# Patient Record
Sex: FEMALE | HISPANIC OR LATINO
[De-identification: names, ages, dates, MRNs, and addresses within clinical notes are randomized per-mention and may not be internally consistent; named-entity substitution may affect disease eponyms.]

---
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
## 2024-08-29 ENCOUNTER — LAB ENCOUNTER (OUTPATIENT)
Dept: LAB | Age: 19
End: 2024-08-29
Attending: INTERNAL MEDICINE
Payer: COMMERCIAL

## 2024-08-29 ENCOUNTER — HOSPITAL ENCOUNTER (OUTPATIENT)
Dept: GENERAL RADIOLOGY | Age: 19
Discharge: HOME | End: 2024-08-29
Attending: INTERNAL MEDICINE
Payer: COMMERCIAL

## 2024-08-29 ENCOUNTER — OFFICE VISIT (OUTPATIENT)
Dept: RHEUMATOLOGY | Facility: CLINIC | Age: 19
End: 2024-08-29
Payer: COMMERCIAL

## 2024-08-29 VITALS
DIASTOLIC BLOOD PRESSURE: 76 MMHG | HEIGHT: 66 IN | HEART RATE: 93 BPM | TEMPERATURE: 99 F | WEIGHT: 260 LBS | SYSTOLIC BLOOD PRESSURE: 110 MMHG | BODY MASS INDEX: 41.78 KG/M2 | OXYGEN SATURATION: 98 % | RESPIRATION RATE: 16 BRPM

## 2024-08-29 DIAGNOSIS — M05.79 RHEUMATOID ARTHRITIS INVOLVING MULTIPLE SITES WITH POSITIVE RHEUMATOID FACTOR (HCC): ICD-10-CM

## 2024-08-29 DIAGNOSIS — M15.0 PRIMARY OSTEOARTHRITIS INVOLVING MULTIPLE JOINTS: ICD-10-CM

## 2024-08-29 DIAGNOSIS — M05.79 RHEUMATOID ARTHRITIS INVOLVING MULTIPLE SITES WITH POSITIVE RHEUMATOID FACTOR (HCC): Primary | ICD-10-CM

## 2024-08-29 PROBLEM — M15.9 OSTEOARTHRITIS OF MULTIPLE JOINTS: Status: ACTIVE | Noted: 2024-08-29

## 2024-08-29 LAB
ALBUMIN SERPL-MCNC: 4.9 G/DL (ref 3.2–4.8)
ALBUMIN/GLOB SERPL: 1.4 {RATIO} (ref 1–2)
ALP LIVER SERPL-CCNC: 90 U/L
ALT SERPL-CCNC: 52 U/L
ANION GAP SERPL CALC-SCNC: 4 MMOL/L (ref 0–18)
AST SERPL-CCNC: 24 U/L (ref ?–34)
BASOPHILS # BLD AUTO: 0.03 X10(3) UL (ref 0–0.2)
BASOPHILS NFR BLD AUTO: 0.4 %
BILIRUB SERPL-MCNC: 0.7 MG/DL (ref 0.3–1.2)
BUN BLD-MCNC: 10 MG/DL (ref 9–23)
CALCIUM BLD-MCNC: 10 MG/DL (ref 8.7–10.4)
CHLORIDE SERPL-SCNC: 101 MMOL/L (ref 98–112)
CO2 SERPL-SCNC: 32 MMOL/L (ref 21–32)
CREAT BLD-MCNC: 0.53 MG/DL
CRP SERPL-MCNC: 0.8 MG/DL (ref ?–0.5)
EGFRCR SERPLBLD CKD-EPI 2021: 136 ML/MIN/1.73M2 (ref 60–?)
EOSINOPHIL # BLD AUTO: 0.05 X10(3) UL (ref 0–0.7)
EOSINOPHIL NFR BLD AUTO: 0.6 %
ERYTHROCYTE [DISTWIDTH] IN BLOOD BY AUTOMATED COUNT: 14.3 %
ERYTHROCYTE [SEDIMENTATION RATE] IN BLOOD: 53 MM/HR
FASTING STATUS PATIENT QL REPORTED: YES
GLOBULIN PLAS-MCNC: 3.5 G/DL (ref 2–3.5)
GLUCOSE BLD-MCNC: 113 MG/DL (ref 70–99)
HBV CORE AB SERPL QL IA: NONREACTIVE
HBV SURFACE AB SER QL: REACTIVE
HBV SURFACE AB SERPL IA-ACNC: 50.39 MIU/ML
HBV SURFACE AG SER-ACNC: <0.1 [IU]/L
HBV SURFACE AG SERPL QL IA: NONREACTIVE
HCT VFR BLD AUTO: 38.7 %
HGB BLD-MCNC: 12.2 G/DL
IGA SERPL-MCNC: 484.2 MG/DL (ref 40–350)
IGM SERPL-MCNC: 118.5 MG/DL (ref 50–300)
IMM GRANULOCYTES # BLD AUTO: 0.05 X10(3) UL (ref 0–1)
IMM GRANULOCYTES NFR BLD: 0.6 %
IMMUNOGLOBULIN PNL SER-MCNC: 1569 MG/DL (ref 650–1600)
LYMPHOCYTES # BLD AUTO: 1.46 X10(3) UL (ref 1–4)
LYMPHOCYTES NFR BLD AUTO: 17.2 %
MCH RBC QN AUTO: 25.3 PG (ref 26–34)
MCHC RBC AUTO-ENTMCNC: 31.5 G/DL (ref 31–37)
MCV RBC AUTO: 80.1 FL
MONOCYTES # BLD AUTO: 0.61 X10(3) UL (ref 0.1–1)
MONOCYTES NFR BLD AUTO: 7.2 %
NEUTROPHILS # BLD AUTO: 6.28 X10 (3) UL (ref 1.5–7.7)
NEUTROPHILS # BLD AUTO: 6.28 X10(3) UL (ref 1.5–7.7)
NEUTROPHILS NFR BLD AUTO: 74 %
OSMOLALITY SERPL CALC.SUM OF ELEC: 284 MOSM/KG (ref 275–295)
PLATELET # BLD AUTO: 363 10(3)UL (ref 150–450)
POTASSIUM SERPL-SCNC: 4.3 MMOL/L (ref 3.5–5.1)
PROT SERPL-MCNC: 8.4 G/DL (ref 5.7–8.2)
RBC # BLD AUTO: 4.83 X10(6)UL
SODIUM SERPL-SCNC: 137 MMOL/L (ref 136–145)
URATE SERPL-MCNC: 7 MG/DL
VIT D+METAB SERPL-MCNC: 12.4 NG/ML (ref 30–100)
WBC # BLD AUTO: 8.5 X10(3) UL (ref 4–11)

## 2024-08-29 PROCEDURE — 85652 RBC SED RATE AUTOMATED: CPT

## 2024-08-29 PROCEDURE — 84165 PROTEIN E-PHORESIS SERUM: CPT

## 2024-08-29 PROCEDURE — 82306 VITAMIN D 25 HYDROXY: CPT

## 2024-08-29 PROCEDURE — 86334 IMMUNOFIX E-PHORESIS SERUM: CPT

## 2024-08-29 PROCEDURE — 87522 HEPATITIS C REVRS TRNSCRPJ: CPT

## 2024-08-29 PROCEDURE — 86704 HEP B CORE ANTIBODY TOTAL: CPT

## 2024-08-29 PROCEDURE — 3008F BODY MASS INDEX DOCD: CPT | Performed by: INTERNAL MEDICINE

## 2024-08-29 PROCEDURE — 86235 NUCLEAR ANTIGEN ANTIBODY: CPT

## 2024-08-29 PROCEDURE — 86225 DNA ANTIBODY NATIVE: CPT

## 2024-08-29 PROCEDURE — 71046 X-RAY EXAM CHEST 2 VIEWS: CPT | Performed by: INTERNAL MEDICINE

## 2024-08-29 PROCEDURE — 73130 X-RAY EXAM OF HAND: CPT | Performed by: INTERNAL MEDICINE

## 2024-08-29 PROCEDURE — 36415 COLL VENOUS BLD VENIPUNCTURE: CPT

## 2024-08-29 PROCEDURE — 83521 IG LIGHT CHAINS FREE EACH: CPT

## 2024-08-29 PROCEDURE — 86140 C-REACTIVE PROTEIN: CPT

## 2024-08-29 PROCEDURE — 3074F SYST BP LT 130 MM HG: CPT | Performed by: INTERNAL MEDICINE

## 2024-08-29 PROCEDURE — 84550 ASSAY OF BLOOD/URIC ACID: CPT

## 2024-08-29 PROCEDURE — 3078F DIAST BP <80 MM HG: CPT | Performed by: INTERNAL MEDICINE

## 2024-08-29 PROCEDURE — 80053 COMPREHEN METABOLIC PANEL: CPT

## 2024-08-29 PROCEDURE — 82784 ASSAY IGA/IGD/IGG/IGM EACH: CPT

## 2024-08-29 PROCEDURE — 85025 COMPLETE CBC W/AUTO DIFF WBC: CPT

## 2024-08-29 PROCEDURE — 86706 HEP B SURFACE ANTIBODY: CPT

## 2024-08-29 PROCEDURE — 99205 OFFICE O/P NEW HI 60 MIN: CPT | Performed by: INTERNAL MEDICINE

## 2024-08-29 PROCEDURE — 73630 X-RAY EXAM OF FOOT: CPT | Performed by: INTERNAL MEDICINE

## 2024-08-29 PROCEDURE — 87340 HEPATITIS B SURFACE AG IA: CPT

## 2024-08-29 PROCEDURE — 96372 THER/PROPH/DIAG INJ SC/IM: CPT | Performed by: INTERNAL MEDICINE

## 2024-08-29 RX ORDER — SULFASALAZINE 500 MG/1
500 TABLET ORAL 2 TIMES DAILY
Qty: 60 TABLET | Refills: 1 | Status: SHIPPED | OUTPATIENT
Start: 2024-08-29

## 2024-08-29 RX ORDER — METHYLPREDNISOLONE ACETATE 80 MG/ML
80 INJECTION, SUSPENSION INTRA-ARTICULAR; INTRALESIONAL; INTRAMUSCULAR; SOFT TISSUE ONCE
Status: COMPLETED | OUTPATIENT
Start: 2024-08-29 | End: 2024-08-29

## 2024-08-29 RX ORDER — PREDNISONE 10 MG/1
10 TABLET ORAL DAILY
Qty: 30 TABLET | Refills: 0 | Status: SHIPPED | OUTPATIENT
Start: 2024-08-29

## 2024-08-29 RX ADMIN — METHYLPREDNISOLONE ACETATE 80 MG: 80 INJECTION, SUSPENSION INTRA-ARTICULAR; INTRALESIONAL; INTRAMUSCULAR; SOFT TISSUE at 10:19:00

## 2024-08-29 NOTE — PROGRESS NOTES
[facility], [address] [place] [address]      Consult     Kath Echevarria Patient Status:  No patient class for patient encounter    2005 MRN [id number]   Location [facility], [address] [place] [address] Attending No att. providers found   Hosp Day # 0 PCP Avinash Riggs MD     Referring Provider: PCP    Reason for Consultation:   Component  Ref Range & Units 23  5:38 PM   RA LATEX TURBID.  <14.0 IU/mL 74.0 High      Component  Ref Range & Units 23  5:38 PM   C-REACTIVE PROTEIN, QUANT  0 - 10 mg/L 13 High      Component 23  5:38 PM   ANTINUCLEAR ANTIBODIES, IFA Positive Abnormal    Comment:                    Component  Ref Range & Units 10/10/22 12:32 PM   Cyclic Citrullinated Peptide Antibody  <=19 Units 140 High    Comment: Approximately 70% of patients with Rheumatoid Arthritis(RA) are positive for CCP antibody, while only 2% of random blood donors are positive.  The diagnostic value of CCP antibody i     omponent  Ref Range & Units 10/10/22 12:32 PM   Uric Acid  2.6 - 5.9 mg/dL 6.8 High    Comment: The optimal level of uric acid in patients with gout or uric acid stone formation should be 6.0 mg/dL or less and      Subjective:    Kath Echevarria is a 18 year old female with comes in for further evaluation of hand pain and stiffness around early 2022. Then started having issues with wrist and fingers and then went to ER and had X rays of the hand with swelling.     Then saw rheumatologist after trying NSAIDS.    Then started starting happening more frequently. Pain occurred every day and PCP started the plaquenil in . And took medication for about 1 month and maybe stopped because of no followup and no refills.     NO major issues with shoulders, elbows ,     Has constant pain and swelling wrists or hands (difficulty with gribbing)     Has some on and off knee pain; (right) no low back; hip pain (on and off)     Denies any major issues with  her neck    Denies any history of DVT PE TIA CVA seizures migraines or headaches    States no shortness of breath ,chest pain ,fevers ,chills    States no urinary or bowel symptoms; bloody stools    States no headaches jaw pain, vision changes (once in while)     States no history of pericardial, pleural effusions     States no significant dry eyes or dry mouth     States no history of uveitis iritis scleritis    States no history of Raynaud's or digital ulcerations    States no major weight changes; night sweats    Wt Readings from Last 6 Encounters:   08/29/24 260 lb (117.9 kg)       Her weight has been stable    States no history of photosensitive or malar rash.    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 No pertinent past medical history.     Past Surgical History: History reviewed. No pertinent surgical history.    Social History:  reports that she has been smoking cigarettes. She has never used smokeless tobacco. She reports that she does not currently use alcohol. She reports that she does not use drugs.    Family History: History reviewed. No pertinent family history.    Allergies: No Known Allergies    Current Medications:  Current Outpatient Medications   Medication Sig Dispense Refill    sulfaSALAzine 500 MG Oral Tab Take 1 tablet (500 mg total) by mouth 2 (two) times daily. 60 tablet 1    predniSONE 10 MG Oral Tab Take 1 tablet (10 mg total) by mouth daily. With food 30 tablet 0      No current facility-administered medications for this visit.     (Not in a hospital admission)      Review of Systems:     Constitutional: Negative for chills, , fatigue, fever and unexpected weight change.    HENT: Negative for congestion, and mouth  sores.    Eyes: Negative for photophobia, pain, redness and visual disturbance.    Respiratory: Negative for apnea, cough, chest tightness, shortness of breath, wheezing and stridor.    Cardiovascular: Negative for chest pain, palpitations and leg swelling.    Gastrointestinal: Negative for abdominal distention, abdominal pain, blood in stool, constipation, diarrhea and nausea.    Endocrine: Negative.     Genitourinary: Negative for decreased urine volume, difficulty urinating, dyspareunia, dysuria, flank pain, and frequency.    Musculoskeletal: +arthralgias, NO gait problem and +joint swelling.    Skin: Negative for color change, pallor and rash. No raynauds or digital ulcerations no sclerodactly.    Allergic/Immunologic: Negative.    Neurological: Negative for dizziness, tremors, seizures, syncope, speech difficulty, weakness, light-headedness, numbness and headaches.    Hematological: Does not bruise/bleed easily.    Psychiatric/Behavioral: Negative for confusion, decreased concentration, hallucinations, self-injury, sleep disturbance and suicidal ideas or depression.    Objective:   Vitals:    08/29/24 0924   BP: 110/76   Pulse: 93   Resp: 16   Temp: 98.7 °F (37.1 °C)          Constitutional: is oriented to person, place, and time. Appears well-developed and well-nourished. No distress.    HEENT: Normocephalic; EOMI; no jvd; no LAD; no oral or nasal ulcers.     Eyes: Conjunctivae and EOM are normal. Pupils are equal, round, and reactive to light.     Neck: Normal range of motion. No thyromegaly present.    Cardiovascular: RRR, no murmurs.    Lungs: Clear, Bilateral air entry, no wheezes.    Abdominal: Soft.    Musculoskeletal:         Joint Exam 08/29/2024        Right  Left   Acromioclavicular   Tender   Tender   Glenohumeral   Tender   Tender   Elbow   Tender   Tender   Wrist  Swollen Tender  Swollen Tender   CMC  Swollen Tender  Swollen Tender   MCP 1  Swollen Tender      MCP 2  Swollen Tender  Swollen Tender    MCP 3  Swollen Tender  Swollen Tender   PIP 2 (finger)  Swollen Tender  Swollen Tender   PIP 3 (finger)  Swollen Tender  Swollen Tender   PIP 4 (finger)  Swollen Tender  Swollen Tender   PIP 5 (finger)  Swollen Tender  Swollen Tender   Hip   Tender   Tender   Knee  Swollen Tender   Tender   MTP 1  Swollen Tender  Swollen Tender   MTP 2  Swollen Tender  Swollen Tender   MTP 3  Swollen   Swollen Tender   MTP 4  Swollen Tender  Swollen Tender   IP (toe)  Swollen            Swollen: 27      Tender: 34          Right shoulder: Exhibits normal range of motion on abduction and internal rotation, no tenderness, no bony tenderness, no deformity, no laceration, no pain and no spasm.        Left shoulder: Exhibits normal range of motion on abduction and internal and external rotation.  no tenderness, no bony tenderness, no swelling, no effusion, no deformity, no pain, no spasm and normal strength.        Right elbow:  Exhibits normal range of motion, no swelling, no effusion and no deformity. No tenderness found. No medial epicondyle, no lateral epicondyle and no olecranon process tenderness noted. There are no contractures or tophi or nodules.        Left elbow:  Normal range of motion, no swelling, no effusion and no deformity. No medial epicondyle, no lateral epicondyle and no olecranon process tenderness noted. There are no contractures or tophi or nodules.        Right wrist:  Exhibits normal range of motion, no tenderness, no bony tenderness, no swelling, no effusion and no crepitus. Flexion and extension intact w/o limitation.        Left wrist: Exhibits normal range of motion, no tenderness, no bony tenderness, no swelling, no effusion, no crepitus and no deformity. Flexion and extension intact without limitation.        Right hip: Exhibits normal range of motion, normal strength, no tenderness, no bony tenderness, no swelling and no crepitus.        Right hand: No synovitis of MCP,PIP or DIP joints; no Bouchards or  Heberden nodules noted;  strength: 100%.        Left hand: No synovitis of MCP,PIP or DIP joints; no Bouchards or Heberden nodules noted;  strength: 100%.        Left hip: Exhibits normal range of motion, normal strength, no tenderness, no bony tenderness, No swelling and no crepitus.        Right knee: Exhibits normal range of motion, no swelling, no effusion, no ecchymosis, no deformity and no erythema. No tenderness found. No medial joint line, no lateral joint line, no MCL and no LCL tenderness noted. No crepitation on flexion of knee and extension normal.        Left knee:  Exhibits normal range of motion, no swelling, no effusion, no ecchymosis and no erythema. No tenderness found. No medial joint line, no lateral joint line and no patellar tendon tenderness noted. No crepitation on flexion of the knee. Extension intact and normal.        Right ankle: No swelling, no deformity. No tenderness. Dorsiflexion and plantar flexion intact without limitation in range of motion.        Left ankle: Exhibits no swelling. No tenderness. No lateral malleolus and no medial malleolus tenderness found. Achilles tendon normal. Achilles tendon exhibits no pain, no defect and normal Blanton's test results.  Dorsiflexion and plantar flexion intact without limitation in range of motion.        Cervical back: Exhibits normal range of motion, no tenderness, no bony tenderness, no swelling, no pain and no spasm.        Thoracic back: Exhibits normal range of motion, no tenderness, no bony tenderness and no spasm.        Lumbar back:  Exhibits normal range of motion, no tenderness, no bony tenderness, no pain and no spasm.        Right foot: normal. There is normal range of motion, no tenderness, no bony tenderness, no crepitus and no laceration. There is no synovitis or tenderness of the MTP joints to palpation.        Left foot: normal. There is normal range of motion, no tenderness, no bony tenderness and no crepitus.  There is no synovitis or tenderness of the MTP joints to palpation.    Lymphadenopathy: No submental, no submandibular, and no occipital adenopathy present, has no cervical adenopathy or axillary lympadenopathy.    Neurological: Alert and oriented. No focal motor or sensory abnormalities. Strength is 5/5 Upper Extremities/Lower Extremities proximally and distally.    Skin: Skin is warm, dry and intact.    Psychiatric: Normal behavior.    Results:    Labs:    omponent  Ref Range & Units 11/10/22 12:18 PM   Glucose 6 Phosphatase Dehydrogenase, Quantitative  9.9 - 16.6 U/g Hb 18.5 High    Comment:  Elevated glucose-6-phosphate dehydrogenase activity may occur when  reticulocytes or white blood cells are present in high  concentrations.  Performed By: [facility]  [address]     Component 10/10/22 12:32 PM   HLA-B27 NEGATIVE   Interpretive Comment HLA B27 NEGATIVE  Approximately 90% of Am     omponent  Ref Range & Units 3/2/23  1:48 PM   QuantiFERON Incubation Incubation performed.   QuantiFERON-TB Gold Plus  Negative Negative   Comment: No response to M tuberculosis antigens detected.  Infection with M tuberculosis is unlikely, but high risk  individuals should be considered for additional testing       No results found for: \"WBC\", \"RBC\", \"HGB\", \"HCT\", \"MCV\", \"MCH\", \"MCHC\", \"RDW\", \"PLT\", \"MPV\", \"LYMPHOCYTES\", \"NEUT\"    No components found for: \"RELY\", \"NMET\", \"MYEL\", \"PROMY\", \"ROSE\", \"ABSNEUTS\", \"ABSBANDS\", \"ABMM\", \"ABMY\", \"ABPM\", \"ABBL\"      No results found for: \"NA\", \"K\", \"CHLORIDE\", \"CO2\", \"BUN\", \"CREAT\", \"GFR\", \"CALCIUM\", \"ALB\", \"ALKPHOS\", \"AST\", \"ALT\"       No components found for: \"ESRWESTERGRN\"       No results found for: \"CRP\"      No results found for: \"COLOR\", \"CLARITY\", \"UROBILINOGEN\", \"YEAST\"  @LABRCNTIP(RF,B12)@      [unfilled]    Imaging:  XR FOOT, COMPLETE (MIN 3 VIEWS), RIGHT (CPT=73630)    Result Date: 8/29/2024  CONCLUSION:  No evidence of acute displaced fracture or dislocation in the  right foot.  LOCATION:  [id number]   Dictated by (CST): Jayy Branch MD on 8/29/2024 at 12:33 PM     Finalized by (CST): Jayy Branch MD on 8/29/2024 at 12:33 PM       XR FOOT, COMPLETE (MIN 3 VIEWS), LEFT (CPT=73630)    Result Date: 8/29/2024  CONCLUSION:  No evidence of acute displaced fracture or dislocation in the left foot.  LOCATION:  [id number]   Dictated by (CST): Jayy Branch MD on 8/29/2024 at 12:31 PM     Finalized by (CST): Jayy Branch MD on 8/29/2024 at 12:32 PM       XR HAND (MIN 3 VIEWS), LEFT (CPT=73130)    Result Date: 8/29/2024  CONCLUSION:  No evidence of acute displaced fracture or dislocation in the left hand.  LOCATION:  [id number]   Dictated by (CST): Jayy Branch MD on 8/29/2024 at 12:31 PM     Finalized by (CST): Jayy Branch MD on 8/29/2024 at 12:31 PM       XR CHEST PA + LAT CHEST (CPT=71046)    Result Date: 8/29/2024  CONCLUSION:  No active cardiopulmonary process identified.   LOCATION:  [id number]   Dictated by (CST): Jayy Branch MD on 8/29/2024 at 12:31 PM     Finalized by (CST): Jayy Branch MD on 8/29/2024 at 12:31 PM       XR HAND (MIN 3 VIEWS), RIGHT (CPT=73130)    Result Date: 8/29/2024  CONCLUSION:  Minimal osteoarthritic changes are present. No acute fracture or other acute bony process.   LOCATION:  [facility]   Dictated by (CST): Kain Grijalva MD on 8/29/2024 at 11:58 AM     Finalized by (CST): Kain Grijalva MD on 8/29/2024 at 12:00 PM        Assessment & Plan:      18-year-old woman comes in for further evaluation for joint swelling stiffness positive RF CCP greater than 150 elevated ESR CRP    Seropositive rheumatoid arthritis  Mild osteoarthritis multiple joint    Patient has moderate synovitis on exam  Depo-Medrol 80 mg subcu today  Complete autoimmune workup borderline hyperuricemia will repeat uric acid levels to make sure she does not have gouty arthritis as well  Will get x-rays of the hands and feet to monitor for baseline radiographic changes x-ray of the  hand noted mild osteoarthritis chest x-ray normal no evidence of ILD  Patient was on hydroxychloroquine for 1 month would not be effective but patient has moderate synovitis in exam  Since she is sexually active and only doing condoms we will opt for sulfasalazine as a next step  Will start 500 mg twice a day.  Repeat CBC CMP sed rate 4 weeks  Potential risk side effects discussed  Recent CMP noted July 2024 will add on CBC with differential  Will also do prednisone 10 mg for 2 weeks 7.5 mg for 1 week 5 mg until seen back.  Risk of steroids discussed    Once we have x-rays and labs will have more definitive plan of treatment if we need to change any plan.    Discussed the diagnosis of rheumatoid arthritis in detail written information given to the patient as well and both steroids and sulfasalazine in the meantime    Education and counseling provided to patient.  Instructed patient to call my office or seek medical attention immediately if symptoms worsen. Risks and side effects of medications and diagnosis discussed in detail and patient was given written information on new prescribed medications.    Return to clinic:  Return in about 8 weeks (around 10/24/2024).    Christine Strickland MD  8/29/2024

## 2024-08-30 LAB
DSDNA IGG SERPL IA-ACNC: 2.3 IU/ML
ENA RNP IGG SER IA-ACNC: 2 U/ML
ENA SM IGG SER IA-ACNC: <0.7 U/ML
ENA SS-A IGG SER IA-ACNC: <0.4 U/ML
U1 SNRNP IGG SER IA-ACNC: 1.2 U/ML

## 2024-09-03 DIAGNOSIS — E55.9 VITAMIN D DEFICIENCY: Primary | ICD-10-CM

## 2024-09-03 LAB
ALBUMIN SERPL ELPH-MCNC: 4.28 G/DL (ref 3.75–5.21)
ALBUMIN/GLOB SERPL: 1.15 {RATIO} (ref 1–2)
ALPHA1 GLOB SERPL ELPH-MCNC: 0.26 G/DL (ref 0.19–0.46)
ALPHA2 GLOB SERPL ELPH-MCNC: 0.81 G/DL (ref 0.48–1.05)
B-GLOBULIN SERPL ELPH-MCNC: 1.14 G/DL (ref 0.68–1.23)
GAMMA GLOB SERPL ELPH-MCNC: 1.5 G/DL (ref 0.62–1.7)
KAPPA LC FREE SER-MCNC: 2.64 MG/DL (ref 0.33–1.94)
KAPPA LC FREE/LAMBDA FREE SER NEPH: 1.24 {RATIO} (ref 0.26–1.65)
LAMBDA LC FREE SERPL-MCNC: 2.13 MG/DL (ref 0.57–2.63)
PROT SERPL-MCNC: 8 G/DL (ref 5.7–8.2)

## 2024-09-03 NOTE — TELEPHONE ENCOUNTER
omponent  Ref Range & Units 5 d ago   Vitamin D, 25OH, Total  30.0 - 100.0 ng/mL 12.4 Low    Comment: Literature Recommendations for 25(OH)D levels are:  Range           Vitamin D Status   <20    ng/mL      Deficiency       Vitamin D is also very low at 12.4 would do high-dose vitamin D 50,000 units once a week for 3 months and then OTC at least 3 to 4000 IU D3 please send back prescription to me patient is willing she should have already been started on prednisone and sulfasalazine    Remind patient that she needs sulfasalazine labs in 4 weeks    Will place these orders accordingly if she is getting them done here we can mail it to wherever she would like

## 2024-09-05 RX ORDER — ERGOCALCIFEROL 1.25 MG/1
50000 CAPSULE, LIQUID FILLED ORAL WEEKLY
Qty: 12 CAPSULE | Refills: 0 | Status: SHIPPED | OUTPATIENT
Start: 2024-09-05 | End: 2024-10-05

## 2024-09-05 NOTE — TELEPHONE ENCOUNTER
Spoke to patient regarding the below message:    Spoke to patient regarding the below message:    omponent  Ref Range & Units 5 d ago   Vitamin D, 25OH, Total  30.0 - 100.0 ng/mL 12.4 Low    Comment: Literature Recommendations for 25(OH)D levels are:  Range           Vitamin D Status   <20    ng/mL      Deficiency         Vitamin D is also very low at 12.4 would do high-dose vitamin D 50,000 units once a week for 3 months and then OTC at least 3 to 4000 IU D3 please send back prescription to me patient is willing she should have already been started on prednisone and sulfasalazine     Remind patient that she needs sulfasalazine labs in 4 weeks     Will place these orders accordingly if she is getting them done here we can mail it to wherever she would like     Patient verbalized understanding and denied any questions at this time. She is agreeable to high dose Vitamin D. High dose Vitamin D pended below for your approval.

## 2024-11-04 ENCOUNTER — OFFICE VISIT (OUTPATIENT)
Dept: RHEUMATOLOGY | Facility: CLINIC | Age: 19
End: 2024-11-04
Payer: COMMERCIAL

## 2024-11-04 ENCOUNTER — LAB ENCOUNTER (OUTPATIENT)
Dept: LAB | Age: 19
End: 2024-11-04
Attending: INTERNAL MEDICINE
Payer: COMMERCIAL

## 2024-11-04 VITALS
OXYGEN SATURATION: 98 % | BODY MASS INDEX: 41.95 KG/M2 | HEIGHT: 66 IN | RESPIRATION RATE: 16 BRPM | SYSTOLIC BLOOD PRESSURE: 118 MMHG | DIASTOLIC BLOOD PRESSURE: 58 MMHG | HEART RATE: 89 BPM | WEIGHT: 261 LBS | TEMPERATURE: 98 F

## 2024-11-04 DIAGNOSIS — M15.0 PRIMARY OSTEOARTHRITIS INVOLVING MULTIPLE JOINTS: ICD-10-CM

## 2024-11-04 DIAGNOSIS — E55.9 VITAMIN D DEFICIENCY: ICD-10-CM

## 2024-11-04 DIAGNOSIS — Z79.899 ENCOUNTER FOR DRUG THERAPY: ICD-10-CM

## 2024-11-04 DIAGNOSIS — M05.79 RHEUMATOID ARTHRITIS INVOLVING MULTIPLE SITES WITH POSITIVE RHEUMATOID FACTOR (HCC): ICD-10-CM

## 2024-11-04 DIAGNOSIS — M05.79 RHEUMATOID ARTHRITIS INVOLVING MULTIPLE SITES WITH POSITIVE RHEUMATOID FACTOR (HCC): Primary | ICD-10-CM

## 2024-11-04 LAB
ALBUMIN SERPL-MCNC: 4.6 G/DL (ref 3.2–4.8)
ALBUMIN/GLOB SERPL: 1.4 {RATIO} (ref 1–2)
ALP LIVER SERPL-CCNC: 101 U/L
ALT SERPL-CCNC: 31 U/L
ANION GAP SERPL CALC-SCNC: 4 MMOL/L (ref 0–18)
AST SERPL-CCNC: 15 U/L (ref ?–34)
BASOPHILS # BLD AUTO: 0.05 X10(3) UL (ref 0–0.2)
BASOPHILS NFR BLD AUTO: 0.4 %
BILIRUB SERPL-MCNC: 0.5 MG/DL (ref 0.3–1.2)
BUN BLD-MCNC: 10 MG/DL (ref 9–23)
CALCIUM BLD-MCNC: 9.4 MG/DL (ref 8.7–10.4)
CHLORIDE SERPL-SCNC: 104 MMOL/L (ref 98–112)
CO2 SERPL-SCNC: 29 MMOL/L (ref 21–32)
CREAT BLD-MCNC: 0.89 MG/DL
CRP SERPL-MCNC: <0.4 MG/DL (ref ?–0.5)
EGFRCR SERPLBLD CKD-EPI 2021: 95 ML/MIN/1.73M2 (ref 60–?)
EOSINOPHIL # BLD AUTO: 0.06 X10(3) UL (ref 0–0.7)
EOSINOPHIL NFR BLD AUTO: 0.5 %
ERYTHROCYTE [DISTWIDTH] IN BLOOD BY AUTOMATED COUNT: 15.6 %
ERYTHROCYTE [SEDIMENTATION RATE] IN BLOOD: 42 MM/HR
FASTING STATUS PATIENT QL REPORTED: NO
GLOBULIN PLAS-MCNC: 3.2 G/DL (ref 2–3.5)
GLUCOSE BLD-MCNC: 121 MG/DL (ref 70–99)
HCT VFR BLD AUTO: 39.5 %
HGB BLD-MCNC: 12.7 G/DL
IMM GRANULOCYTES # BLD AUTO: 0.1 X10(3) UL (ref 0–1)
IMM GRANULOCYTES NFR BLD: 0.9 %
LYMPHOCYTES # BLD AUTO: 2.5 X10(3) UL (ref 1–4)
LYMPHOCYTES NFR BLD AUTO: 22.4 %
MCH RBC QN AUTO: 25.8 PG (ref 26–34)
MCHC RBC AUTO-ENTMCNC: 32.2 G/DL (ref 31–37)
MCV RBC AUTO: 80.3 FL
MONOCYTES # BLD AUTO: 0.81 X10(3) UL (ref 0.1–1)
MONOCYTES NFR BLD AUTO: 7.3 %
NEUTROPHILS # BLD AUTO: 7.65 X10 (3) UL (ref 1.5–7.7)
NEUTROPHILS # BLD AUTO: 7.65 X10(3) UL (ref 1.5–7.7)
NEUTROPHILS NFR BLD AUTO: 68.5 %
OSMOLALITY SERPL CALC.SUM OF ELEC: 284 MOSM/KG (ref 275–295)
PLATELET # BLD AUTO: 373 10(3)UL (ref 150–450)
POTASSIUM SERPL-SCNC: 4.2 MMOL/L (ref 3.5–5.1)
PROT SERPL-MCNC: 7.8 G/DL (ref 5.7–8.2)
RBC # BLD AUTO: 4.92 X10(6)UL
SODIUM SERPL-SCNC: 137 MMOL/L (ref 136–145)
VIT D+METAB SERPL-MCNC: 7.6 NG/ML (ref 30–100)
WBC # BLD AUTO: 11.2 X10(3) UL (ref 4–11)

## 2024-11-04 PROCEDURE — 3078F DIAST BP <80 MM HG: CPT | Performed by: INTERNAL MEDICINE

## 2024-11-04 PROCEDURE — 99215 OFFICE O/P EST HI 40 MIN: CPT | Performed by: INTERNAL MEDICINE

## 2024-11-04 PROCEDURE — 86140 C-REACTIVE PROTEIN: CPT

## 2024-11-04 PROCEDURE — 85025 COMPLETE CBC W/AUTO DIFF WBC: CPT

## 2024-11-04 PROCEDURE — 85652 RBC SED RATE AUTOMATED: CPT

## 2024-11-04 PROCEDURE — 82306 VITAMIN D 25 HYDROXY: CPT

## 2024-11-04 PROCEDURE — 3008F BODY MASS INDEX DOCD: CPT | Performed by: INTERNAL MEDICINE

## 2024-11-04 PROCEDURE — 80053 COMPREHEN METABOLIC PANEL: CPT

## 2024-11-04 PROCEDURE — 3074F SYST BP LT 130 MM HG: CPT | Performed by: INTERNAL MEDICINE

## 2024-11-04 PROCEDURE — 36415 COLL VENOUS BLD VENIPUNCTURE: CPT

## 2024-11-04 RX ORDER — SULFASALAZINE 500 MG/1
1000 TABLET ORAL 2 TIMES DAILY
Qty: 60 TABLET | Refills: 0 | Status: SHIPPED | OUTPATIENT
Start: 2024-11-04

## 2024-11-04 RX ORDER — PREDNISONE 5 MG/1
5 TABLET ORAL DAILY
Qty: 30 TABLET | Refills: 1 | Status: SHIPPED | OUTPATIENT
Start: 2024-11-04

## 2024-11-04 NOTE — PROGRESS NOTES
[facility], [address] [place] [address]      Consult     Kath Echevarria Patient Status:  No patient class for patient encounter    2005 MRN [id number]   Location [facility], [address] [place] [address] Attending No att. providers found   Hosp Day # 0 PCP Avinash Riggs MD     Referring Provider: PCP    Reason for Consultation:   Component  Ref Range & Units 23  5:38 PM   RA LATEX TURBID.  <14.0 IU/mL 74.0 High      Component  Ref Range & Units 23  5:38 PM   C-REACTIVE PROTEIN, QUANT  0 - 10 mg/L 13 High      Component 23  5:38 PM   ANTINUCLEAR ANTIBODIES, IFA Positive Abnormal    Comment:                    Component  Ref Range & Units 10/10/22 12:32 PM   Cyclic Citrullinated Peptide Antibody  <=19 Units 140 High    Comment: Approximately 70% of patients with Rheumatoid Arthritis(RA) are positive for CCP antibody, while only 2% of random blood donors are positive.  The diagnostic value of CCP antibody i     omponent  Ref Range & Units 10/10/22 12:32 PM   Uric Acid  2.6 - 5.9 mg/dL 6.8 High    Comment: The optimal level of uric acid in patients with gout or uric acid stone formation should be 6.0 mg/dL or less and      Subjective:    Kath Echevarria is a 19 year old female with comes in for further evaluation of hand pain and stiffness around early 2022. Then started having issues with wrist and fingers and then went to ER and had X rays of the hand with swelling.     Then saw rheumatologist after trying NSAIDS.    Then started starting happening more frequently. Pain occurred every day and PCP started the plaquenil in . And took medication for about 1 month and maybe stopped because of no followup and no refills.     NO major issues with shoulders, elbows ,     Has constant pain and swelling wrists or hands (difficulty with gribbing)     Has some on and off knee pain; (right) no low back; hip pain (on and off)     Denies any major issues with  her neck    Denies any history of DVT PE TIA CVA seizures migraines or headaches    States no shortness of breath ,chest pain ,fevers ,chills    States no urinary or bowel symptoms; bloody stools    States no headaches jaw pain, vision changes (once in while)     States no history of pericardial, pleural effusions     States no significant dry eyes or dry mouth     States no history of uveitis iritis scleritis    States no history of Raynaud's or digital ulcerations    States no major weight changes; night sweats    Wt Readings from Last 6 Encounters:   11/04/24 261 lb (118.4 kg)   08/29/24 260 lb (117.9 kg)       Her weight has been stable    States no history of photosensitive or malar rash.    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  We have discussed increasing sulfasalazine to thousand twice a day as long as labs are okay today    She states no side effects and does feel significant improvement overall    X-rays of the hands and feet and chest x-ray showing minimal to no arthritis chest x-ray normal from August 2024    Reminded patient again labs must be done after 4 weeks and then every 8 to 12 weeks to monitor for drug toxicity for sulfasalazine    History/Other:      Past Medical  History:History reviewed. No pertinent past medical history.     Past Surgical History: History reviewed. No pertinent surgical history.    Social History:  reports that she has been smoking cigarettes. She has never used smokeless tobacco. She reports that she does not currently use alcohol. She reports that she does not use drugs.    Family History: History reviewed. No pertinent family history.    Allergies: No Known Allergies    Current Medications:  Current Outpatient Medications   Medication Sig Dispense Refill    sulfaSALAzine 500 MG Oral Tab Take 2 tablets (1,000 mg total) by mouth 2 (two) times daily. 60 tablet 0    predniSONE 5 MG Oral Tab Take 1 tablet (5 mg total) by mouth daily. 30 tablet 1      No current facility-administered medications for this visit.     (Not in a hospital admission)      Review of Systems:     Constitutional: Negative for chills, , fatigue, fever and unexpected weight change.    HENT: Negative for congestion, and mouth sores.    Eyes: Negative for photophobia, pain, redness and visual disturbance.    Respiratory: Negative for apnea, cough, chest tightness, shortness of breath, wheezing and stridor.    Cardiovascular: Negative for chest pain, palpitations and leg swelling.    Gastrointestinal: Negative for abdominal distention, abdominal pain, blood in stool, constipation, diarrhea and nausea.    Endocrine: Negative.     Genitourinary: Negative for decreased urine volume, difficulty urinating, dyspareunia, dysuria, flank pain, and frequency.    Musculoskeletal: +arthralgias, NO gait problem and +joint swelling.    Skin: Negative for color change, pallor and rash. No raynauds or digital ulcerations no sclerodactly.    Allergic/Immunologic: Negative.    Neurological: Negative for dizziness, tremors, seizures, syncope, speech difficulty, weakness, light-headedness, numbness and headaches.    Hematological: Does not bruise/bleed easily.    Psychiatric/Behavioral: Negative for  confusion, decreased concentration, hallucinations, self-injury, sleep disturbance and suicidal ideas or depression.    Objective:   Vitals:    11/04/24 1147   BP: 118/58   Pulse: 89   Resp: 16   Temp: 97.9 °F (36.6 °C)          Constitutional: is oriented to person, place, and time. Appears well-developed and well-nourished. No distress.    HEENT: Normocephalic; EOMI; no jvd; no LAD; no oral or nasal ulcers.     Eyes: Conjunctivae and EOM are normal. Pupils are equal, round, and reactive to light.     Neck: Normal range of motion. No thyromegaly present.    Cardiovascular: RRR, no murmurs.    Lungs: Clear, Bilateral air entry, no wheezes.    Abdominal: Soft.    Musculoskeletal:         Joint Exam 11/04/2024        Right  Left   Wrist  Swollen       PIP 3 (finger)  Swollen       PIP 4 (finger)  Swollen            Swollen: 3      Tender: 0          Right shoulder: Exhibits normal range of motion on abduction and internal rotation, no tenderness, no bony tenderness, no deformity, no laceration, no pain and no spasm.        Left shoulder: Exhibits normal range of motion on abduction and internal and external rotation.  no tenderness, no bony tenderness, no swelling, no effusion, no deformity, no pain, no spasm and normal strength.        Right elbow:  Exhibits normal range of motion, no swelling, no effusion and no deformity. No tenderness found. No medial epicondyle, no lateral epicondyle and no olecranon process tenderness noted. There are no contractures or tophi or nodules.        Left elbow:  Normal range of motion, no swelling, no effusion and no deformity. No medial epicondyle, no lateral epicondyle and no olecranon process tenderness noted. There are no contractures or tophi or nodules.        Right wrist:  Exhibits normal range of motion, no tenderness, no bony tenderness, no swelling, no effusion and no crepitus. Flexion and extension intact w/o limitation.        Left wrist: Exhibits normal range of motion,  no tenderness, no bony tenderness, no swelling, no effusion, no crepitus and no deformity. Flexion and extension intact without limitation.        Right hip: Exhibits normal range of motion, normal strength, no tenderness, no bony tenderness, no swelling and no crepitus.        Right hand: No synovitis of MCP or DIP joints; no Bouchards or Heberden nodules noted;  strength: 100%.        Left hand: No synovitis of MCP,PIP or DIP joints; no Bouchards or Heberden nodules noted;  strength: 100%.        Left hip: Exhibits normal range of motion, normal strength, no tenderness, no bony tenderness, No swelling and no crepitus.        Right knee: Exhibits normal range of motion, no swelling, no effusion, no ecchymosis, no deformity and no erythema. No tenderness found. No medial joint line, no lateral joint line, no MCL and no LCL tenderness noted. No crepitation on flexion of knee and extension normal.        Left knee:  Exhibits normal range of motion, no swelling, no effusion, no ecchymosis and no erythema. No tenderness found. No medial joint line, no lateral joint line and no patellar tendon tenderness noted. No crepitation on flexion of the knee. Extension intact and normal.        Right ankle: No swelling, no deformity. No tenderness. Dorsiflexion and plantar flexion intact without limitation in range of motion.        Left ankle: Exhibits no swelling. No tenderness. No lateral malleolus and no medial malleolus tenderness found. Achilles tendon normal. Achilles tendon exhibits no pain, no defect and normal Blanton's test results.  Dorsiflexion and plantar flexion intact without limitation in range of motion.        Cervical back: Exhibits normal range of motion, no tenderness, no bony tenderness, no swelling, no pain and no spasm.        Thoracic back: Exhibits normal range of motion, no tenderness, no bony tenderness and no spasm.        Lumbar back:  Exhibits normal range of motion, no tenderness, no bony  tenderness, no pain and no spasm.        Right foot: normal. There is normal range of motion, no tenderness, no bony tenderness, no crepitus and no laceration. There is no synovitis or tenderness of the MTP joints to palpation.        Left foot: normal. There is normal range of motion, no tenderness, no bony tenderness and no crepitus. There is no synovitis or tenderness of the MTP joints to palpation.    Lymphadenopathy: No submental, no submandibular, and no occipital adenopathy present, has no cervical adenopathy or axillary lympadenopathy.    Neurological: Alert and oriented. No focal motor or sensory abnormalities. Strength is 5/5 Upper Extremities/Lower Extremities proximally and distally.    Skin: Skin is warm, dry and intact.    Psychiatric: Normal behavior.    Results:    Labs:    omponent  Ref Range & Units 11/10/22 12:18 PM   Glucose 6 Phosphatase Dehydrogenase, Quantitative  9.9 - 16.6 U/g Hb 18.5 High    Comment:  Elevated glucose-6-phosphate dehydrogenase activity may occur when  reticulocytes or white blood cells are present in high  concentrations.  Performed By: [facility]  [address]     Component 10/10/22 12:32 PM   HLA-B27 NEGATIVE   Interpretive Comment HLA B27 NEGATIVE  Approximately 90% of Am     omponent  Ref Range & Units 3/2/23  1:48 PM   QuantiFERON Incubation Incubation performed.   QuantiFERON-TB Gold Plus  Negative Negative   Comment: No response to M tuberculosis antigens detected.  Infection with M tuberculosis is unlikely, but high risk  individuals should be considered for additional testing       Lab Results   Component Value Date    WBC 8.5 08/29/2024    RBC 4.83 08/29/2024    HGB 12.2 08/29/2024    HCT 38.7 08/29/2024    MCV 80.1 08/29/2024    MCH 25.3 (L) 08/29/2024    MCHC 31.5 08/29/2024    RDW 14.3 08/29/2024    .0 08/29/2024       No components found for: \"RELY\", \"NMET\", \"MYEL\", \"PROMY\", \"ROSE\", \"ABSNEUTS\", \"ABSBANDS\", \"ABMM\", \"ABMY\", \"ABPM\",  \"ABBL\"      Lab Results   Component Value Date     08/29/2024    K 4.3 08/29/2024    CO2 32.0 08/29/2024    BUN 10 08/29/2024    ALB 4.9 (H) 08/29/2024    ALB 4.28 08/29/2024    AST 24 08/29/2024    ALT 52 (H) 08/29/2024          No components found for: \"ESRWESTERGRN\"       Lab Results   Component Value Date    CRP 0.80 (H) 08/29/2024         No results found for: \"COLOR\", \"CLARITY\", \"UROBILINOGEN\", \"YEAST\"  @LABRCNTIP(RF,B12)@      [unfilled]    Imaging:  XR FOOT, COMPLETE (MIN 3 VIEWS), RIGHT (CPT=73630)    Result Date: 8/29/2024  CONCLUSION:  No evidence of acute displaced fracture or dislocation in the right foot.  LOCATION:  [id number]   Dictated by (CST): Jayy Branch MD on 8/29/2024 at 12:33 PM     Finalized by (CST): Jayy Branch MD on 8/29/2024 at 12:33 PM       XR FOOT, COMPLETE (MIN 3 VIEWS), LEFT (CPT=73630)    Result Date: 8/29/2024  CONCLUSION:  No evidence of acute displaced fracture or dislocation in the left foot.  LOCATION:  [id number]   Dictated by (CST): Jayy Branch MD on 8/29/2024 at 12:31 PM     Finalized by (CST): Jayy Branch MD on 8/29/2024 at 12:32 PM       XR HAND (MIN 3 VIEWS), LEFT (CPT=73130)    Result Date: 8/29/2024  CONCLUSION:  No evidence of acute displaced fracture or dislocation in the left hand.  LOCATION:  [id number]   Dictated by (CST): Jayy Branch MD on 8/29/2024 at 12:31 PM     Finalized by (CST): Jayy Branch MD on 8/29/2024 at 12:31 PM       XR CHEST PA + LAT CHEST (CPT=71046)    Result Date: 8/29/2024  CONCLUSION:  No active cardiopulmonary process identified.   LOCATION:  [id number]   Dictated by (CST): Jayy Branch MD on 8/29/2024 at 12:31 PM     Finalized by (CST): Jayy Branch MD on 8/29/2024 at 12:31 PM       XR HAND (MIN 3 VIEWS), RIGHT (CPT=73130)    Result Date: 8/29/2024  CONCLUSION:  Minimal osteoarthritic changes are present. No acute fracture or other acute bony process.   LOCATION:  [facility]   Dictated by (CST): Kain Grijalva MD on  8/29/2024 at 11:58 AM     Finalized by (CST): Kain Grijalva MD on 8/29/2024 at 12:00 PM        Assessment & Plan:      18-year-old woman comes in for further evaluation for joint swelling stiffness positive RF CCP greater than 150 elevated ESR CRP    Seropositive rheumatoid arthritis  Mild osteoarthritis multiple joint    Significant improvement with minimal synovitis on exam  Increase sulfasalazine 1000 mg twice a day  Restart prednisone 5 mg for 30 days  Reminded patient to get labs today to monitor for drug toxicity in 4 weeks after dose increase  Uric acid levels normal   X-rays of the hands and feet showing mild arthritic changes no erosions from 2024   Chest x-ray normal 2024     Minimal improvement on hydroxychloroquine after 1 month    Since she is sexually active and only doing condoms we will opt for sulfasalazine as a next step    Prednisone 5 mg daily for 30 days; risk of steroids discussed    Discussed the diagnosis of rheumatoid arthritis in detail written information given to the patient as well and both steroids and sulfasalazine in the meantime    Education and counseling provided to patient.  Instructed patient to call my office or seek medical attention immediately if symptoms worsen. Risks and side effects of medications and diagnosis discussed in detail and patient was given written information on new prescribed medications.    Return to clinic:  Return in about 3 months (around 2/4/2025).    Christine Strickland MD  8/29/2024

## 2024-11-11 DIAGNOSIS — E55.9 VITAMIN D DEFICIENCY: Primary | ICD-10-CM

## 2024-11-11 NOTE — TELEPHONE ENCOUNTER
Result Notes       Component  Ref Range & Units 7 d ago 2 mo ago   Vitamin D, 25OH, Total  30.0 - 100.0 ng/mL 7.6 Low  12.4 Low  CM   Comment: Literature Recommendations for 25(OH)D levels are:  Range           Vitamin D Status   <20    ng/mL      Deficiency   20-<30 ng/mL      Insufficiency    ng/mL      Sufficiency   >100   ng/mL      Toxicity          Vitamin D is low at 7.6 would do high-dose vitamin D 50,000 units once a week for 3 months then OTC at least 3 to 4000 IU D3    Patient is agreeable please send prescription back to me

## 2024-11-12 RX ORDER — ERGOCALCIFEROL 1.25 MG/1
50000 CAPSULE, LIQUID FILLED ORAL WEEKLY
Qty: 12 CAPSULE | Refills: 0 | Status: SHIPPED | OUTPATIENT
Start: 2024-11-12 | End: 2024-12-12

## 2024-11-12 NOTE — TELEPHONE ENCOUNTER
Left detailed message on patient's voicemail regarding the below message:     Result Notes          Component  Ref Range & Units 7 d ago 2 mo ago   Vitamin D, 25OH, Total  30.0 - 100.0 ng/mL 7.6 Low  12.4 Low  CM   Comment: Literature Recommendations for 25(OH)D levels are:  Range           Vitamin D Status   <20    ng/mL      Deficiency   20-<30 ng/mL      Insufficiency    ng/mL      Sufficiency   >100   ng/mL      Toxicity            Vitamin D is low at 7.6 would do high-dose vitamin D 50,000 units once a week for 3 months then OTC at least 3 to 4000 IU D3     Patient is agreeable please send prescription back to me       Patient was asked to call the office back with any questions she may have. High dose Vitamin D pended below for your approval.

## 2024-11-13 ENCOUNTER — TELEPHONE (OUTPATIENT)
Dept: RHEUMATOLOGY | Facility: CLINIC | Age: 19
End: 2024-11-13

## 2024-11-13 RX ORDER — PANTOPRAZOLE SODIUM 20 MG/1
20 TABLET, DELAYED RELEASE ORAL
Qty: 30 TABLET | Refills: 0 | Status: SHIPPED | OUTPATIENT
Start: 2024-11-13

## 2025-01-28 PROCEDURE — 88175 CYTOPATH C/V AUTO FLUID REDO: CPT | Performed by: INTERNAL MEDICINE

## 2025-01-28 PROCEDURE — 87624 HPV HI-RISK TYP POOLED RSLT: CPT | Performed by: INTERNAL MEDICINE

## 2025-01-28 PROCEDURE — 87661 TRICHOMONAS VAGINALIS AMPLIF: CPT | Performed by: INTERNAL MEDICINE

## 2025-01-28 PROCEDURE — 87801 DETECT AGNT MULT DNA AMPLI: CPT | Performed by: INTERNAL MEDICINE

## 2025-01-28 PROCEDURE — 87798 DETECT AGENT NOS DNA AMP: CPT | Performed by: INTERNAL MEDICINE

## 2025-01-29 ENCOUNTER — LAB REQUISITION (OUTPATIENT)
Dept: LAB | Facility: HOSPITAL | Age: 20
End: 2025-01-29
Payer: COMMERCIAL

## 2025-01-29 DIAGNOSIS — Z12.4 ENCOUNTER FOR SCREENING FOR MALIGNANT NEOPLASM OF CERVIX: ICD-10-CM

## 2025-01-29 DIAGNOSIS — Z11.3 ENCOUNTER FOR SCREENING FOR INFECTIONS WITH A PREDOMINANTLY SEXUAL MODE OF TRANSMISSION: ICD-10-CM

## 2025-01-31 LAB
CANDIDA ALBICANS, NAA: NEGATIVE
CANDIDA ALBICANS, NAA: NEGATIVE
CANDIDA GLABRATA, NAA: NEGATIVE
CANDIDA GLABRATA, NAA: NEGATIVE
HPV E6+E7 MRNA CVX QL NAA+PROBE: POSITIVE
TRICH VAG BY NAA: NEGATIVE
TRICH VAG BY NAA: NEGATIVE

## 2025-02-03 ENCOUNTER — OFFICE VISIT (OUTPATIENT)
Dept: RHEUMATOLOGY | Facility: CLINIC | Age: 20
End: 2025-02-03
Payer: COMMERCIAL

## 2025-02-03 VITALS
WEIGHT: 247 LBS | HEIGHT: 66 IN | BODY MASS INDEX: 39.7 KG/M2 | RESPIRATION RATE: 16 BRPM | TEMPERATURE: 98 F | SYSTOLIC BLOOD PRESSURE: 120 MMHG | HEART RATE: 92 BPM | OXYGEN SATURATION: 97 % | DIASTOLIC BLOOD PRESSURE: 70 MMHG

## 2025-02-03 DIAGNOSIS — M05.79 RHEUMATOID ARTHRITIS INVOLVING MULTIPLE SITES WITH POSITIVE RHEUMATOID FACTOR (HCC): Primary | ICD-10-CM

## 2025-02-03 DIAGNOSIS — E55.9 VITAMIN D DEFICIENCY: ICD-10-CM

## 2025-02-03 DIAGNOSIS — M15.0 PRIMARY OSTEOARTHRITIS INVOLVING MULTIPLE JOINTS: ICD-10-CM

## 2025-02-03 DIAGNOSIS — Z91.199 NONCOMPLIANCE: ICD-10-CM

## 2025-02-03 DIAGNOSIS — E79.0 HYPERURICEMIA W/O SIGNS OF INFLAM ARTHRIT AND TOPHACEOUS DIS: ICD-10-CM

## 2025-02-03 DIAGNOSIS — Z79.899 ENCOUNTER FOR DRUG THERAPY: ICD-10-CM

## 2025-02-03 PROCEDURE — 3008F BODY MASS INDEX DOCD: CPT | Performed by: INTERNAL MEDICINE

## 2025-02-03 PROCEDURE — 3074F SYST BP LT 130 MM HG: CPT | Performed by: INTERNAL MEDICINE

## 2025-02-03 PROCEDURE — 3078F DIAST BP <80 MM HG: CPT | Performed by: INTERNAL MEDICINE

## 2025-02-03 PROCEDURE — 99215 OFFICE O/P EST HI 40 MIN: CPT | Performed by: INTERNAL MEDICINE

## 2025-02-03 RX ORDER — SULFASALAZINE 500 MG/1
500 TABLET ORAL 2 TIMES DAILY
Qty: 60 TABLET | Refills: 1 | Status: SHIPPED | OUTPATIENT
Start: 2025-02-03

## 2025-02-03 NOTE — PROGRESS NOTES
[facility], [address] [place] [address]      Consult     Kath Echevarria Patient Status:  No patient class for patient encounter    2005 MRN [id number]   Location [facility], [address] [place] [address] Attending No att. providers found   Hosp Day # 0 PCP Avinash Riggs MD     Referring Provider: PCP    Reason for Consultation:   Component  Ref Range & Units 23  5:38 PM   RA LATEX TURBID.  <14.0 IU/mL 74.0 High      Component  Ref Range & Units 23  5:38 PM   C-REACTIVE PROTEIN, QUANT  0 - 10 mg/L 13 High      Component 23  5:38 PM   ANTINUCLEAR ANTIBODIES, IFA Positive Abnormal    Comment:                    Component  Ref Range & Units 10/10/22 12:32 PM   Cyclic Citrullinated Peptide Antibody  <=19 Units 140 High    Comment: Approximately 70% of patients with Rheumatoid Arthritis(RA) are positive for CCP antibody, while only 2% of random blood donors are positive.  The diagnostic value of CCP antibody i     omponent  Ref Range & Units 10/10/22 12:32 PM   Uric Acid  2.6 - 5.9 mg/dL 6.8 High    Comment: The optimal level of uric acid in patients with gout or uric acid stone formation should be 6.0 mg/dL or less and      Subjective:    Kath Echevarria is a 19 year old female with comes in for further evaluation of hand pain and stiffness around early 2022. Then started having issues with wrist and fingers and then went to ER and had X rays of the hand with swelling.     Then saw rheumatologist after trying NSAIDS.    Then started starting happening more frequently. Pain occurred every day and PCP started the plaquenil in . And took medication for about 1 month and maybe stopped because of no followup and no refills.     NO major issues with shoulders, elbows ,     Has constant pain and swelling wrists or hands (difficulty with gribbing)     Has some on and off knee pain; (right) no low back; hip pain (on and off)     Denies any major issues with  her neck    Denies any history of DVT PE TIA CVA seizures migraines or headaches    States no shortness of breath ,chest pain ,fevers ,chills    States no urinary or bowel symptoms; bloody stools    States no headaches jaw pain, vision changes (once in while)     States no history of pericardial, pleural effusions     States no significant dry eyes or dry mouth     States no history of uveitis iritis scleritis    States no history of Raynaud's or digital ulcerations    States no major weight changes; night sweats    Wt Readings from Last 6 Encounters:   02/03/25 247 lb (112 kg)   11/04/24 261 lb (118.4 kg)   08/29/24 260 lb (117.9 kg)       Her weight has been stable    States no history of photosensitive or malar rash.    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  We have discussed increasing sulfasalazine to thousand twice a day as long as labs are okay today    She states no side effects and does feel significant improvement overall    She was last seen November 2024    She is doing well she stopped her sulfasalazine with the last dosing sometime early December 2024 and prednisone last 5 mg January 2024 in the first week.  She states she is back to working out and doing her regular routine  and playing soccer.  We discussed importance of compliance in order to for her to feel better.  She states possibility of some mood disturbance on sulfasalazine we discussed likelihood of this being related to prednisone and remaining off of that    Is open to going back on low-dose of sulfasalazine 500 twice a day.  Reminded her to get labs to monitor for drug toxicity today and every 3 months    Discussed the importance of compliance in order to prevent irreversible joint damage and debility    X-rays of the hands and feet and chest x-ray showing minimal to no arthritis chest x-ray normal from August 2024      History/Other:      Past Medical History:History reviewed. No pertinent past medical history.     Past Surgical History: History reviewed. No pertinent surgical history.    Social History:  reports that she has been smoking cigarettes. She has never used smokeless tobacco. She reports that she does not currently use alcohol. She reports that she does not use drugs.    Family History: History reviewed. No pertinent family history.    Allergies: No Known Allergies    Current Medications:  Current Outpatient Medications   Medication Sig Dispense Refill    sulfaSALAzine 500 MG Oral Tab Take 1 tablet (500 mg total) by mouth 2 (two) times daily. 60 tablet 1    pantoprazole 20 MG Oral Tab EC Take 1 tablet (20 mg total) by mouth every morning before breakfast. (Patient taking differently: Take 1 tablet (20 mg total) by mouth every morning before breakfast. As needed) 30 tablet 0      No current facility-administered medications for this visit.     (Not in a hospital admission)      Review of Systems:     Constitutional: Negative for chills, , fatigue, fever and unexpected weight change.    HENT: Negative for congestion, and mouth sores.    Eyes: Negative for photophobia, pain, redness and visual disturbance.    Respiratory: Negative for apnea, cough, chest tightness, shortness of breath, wheezing and  stridor.    Cardiovascular: Negative for chest pain, palpitations and leg swelling.    Gastrointestinal: Negative for abdominal distention, abdominal pain, blood in stool, constipation, diarrhea and nausea.    Endocrine: Negative.     Genitourinary: Negative for decreased urine volume, difficulty urinating, dyspareunia, dysuria, flank pain, and frequency.    Musculoskeletal: +arthralgias, NO gait problem and +joint swelling.    Skin: Negative for color change, pallor and rash. No raynauds or digital ulcerations no sclerodactly.    Allergic/Immunologic: Negative.    Neurological: Negative for dizziness, tremors, seizures, syncope, speech difficulty, weakness, light-headedness, numbness and headaches.    Hematological: Does not bruise/bleed easily.    Psychiatric/Behavioral: Negative for confusion, decreased concentration, hallucinations, self-injury, sleep disturbance and suicidal ideas or depression.    Objective:   Vitals:    02/03/25 1304   BP: 120/70   Pulse: 92   Resp: 16   Temp: 98.3 °F (36.8 °C)            Constitutional: is oriented to person, place, and time. Appears well-developed and well-nourished. No distress.    HEENT: Normocephalic; EOMI; no jvd; no LAD; no oral or nasal ulcers.     Eyes: Conjunctivae and EOM are normal. Pupils are equal, round, and reactive to light.     Neck: Normal range of motion. No thyromegaly present.    Cardiovascular: RRR, no murmurs.    Lungs: Clear, Bilateral air entry, no wheezes.    Abdominal: Soft.    Musculoskeletal:         Joint Exam 02/03/2025        Right  Left   PIP 2 (finger)     Swollen Tender   PIP 4 (finger)  Swollen Tender           Swollen: 2      Tender: 2          Right shoulder: Exhibits normal range of motion on abduction and internal rotation, no tenderness, no bony tenderness, no deformity, no laceration, no pain and no spasm.        Left shoulder: Exhibits normal range of motion on abduction and internal and external rotation.  no tenderness, no bony  tenderness, no swelling, no effusion, no deformity, no pain, no spasm and normal strength.        Right elbow:  Exhibits normal range of motion, no swelling, no effusion and no deformity. No tenderness found. No medial epicondyle, no lateral epicondyle and no olecranon process tenderness noted. There are no contractures or tophi or nodules.        Left elbow:  Normal range of motion, no swelling, no effusion and no deformity. No medial epicondyle, no lateral epicondyle and no olecranon process tenderness noted. There are no contractures or tophi or nodules.        Right wrist:  Exhibits normal range of motion, no tenderness, no bony tenderness, no swelling, no effusion and no crepitus. Flexion and extension intact w/o limitation.        Left wrist: Exhibits normal range of motion, no tenderness, no bony tenderness, no swelling, no effusion, no crepitus and no deformity. Flexion and extension intact without limitation.        Right hip: Exhibits normal range of motion, normal strength, no tenderness, no bony tenderness, no swelling and no crepitus.        Right hand: No synovitis of MCP or DIP joints; no Bouchards or Heberden nodules noted;  strength: 100%.        Left hand: No synovitis of MCP,PIP or DIP joints; no Bouchards or Heberden nodules noted;  strength: 100%.        Left hip: Exhibits normal range of motion, normal strength, no tenderness, no bony tenderness, No swelling and no crepitus.        Right knee: Exhibits normal range of motion, no swelling, no effusion, no ecchymosis, no deformity and no erythema. No tenderness found. No medial joint line, no lateral joint line, no MCL and no LCL tenderness noted. No crepitation on flexion of knee and extension normal.        Left knee:  Exhibits normal range of motion, no swelling, no effusion, no ecchymosis and no erythema. No tenderness found. No medial joint line, no lateral joint line and no patellar tendon tenderness noted. No crepitation on flexion  of the knee. Extension intact and normal.        Right ankle: No swelling, no deformity. No tenderness. Dorsiflexion and plantar flexion intact without limitation in range of motion.        Left ankle: Exhibits no swelling. No tenderness. No lateral malleolus and no medial malleolus tenderness found. Achilles tendon normal. Achilles tendon exhibits no pain, no defect and normal Blanton's test results.  Dorsiflexion and plantar flexion intact without limitation in range of motion.        Cervical back: Exhibits normal range of motion, no tenderness, no bony tenderness, no swelling, no pain and no spasm.        Thoracic back: Exhibits normal range of motion, no tenderness, no bony tenderness and no spasm.        Lumbar back:  Exhibits normal range of motion, no tenderness, no bony tenderness, no pain and no spasm.        Right foot: normal. There is normal range of motion, no tenderness, no bony tenderness, no crepitus and no laceration. There is no synovitis or tenderness of the MTP joints to palpation.        Left foot: normal. There is normal range of motion, no tenderness, no bony tenderness and no crepitus. There is no synovitis or tenderness of the MTP joints to palpation.    Lymphadenopathy: No submental, no submandibular, and no occipital adenopathy present, has no cervical adenopathy or axillary lympadenopathy.    Neurological: Alert and oriented. No focal motor or sensory abnormalities. Strength is 5/5 Upper Extremities/Lower Extremities proximally and distally.    Skin: Skin is warm, dry and intact.    Psychiatric: Normal behavior.    Results:    Labs:    omponent  Ref Range & Units 11/10/22 12:18 PM   Glucose 6 Phosphatase Dehydrogenase, Quantitative  9.9 - 16.6 U/g Hb 18.5 High    Comment:  Elevated glucose-6-phosphate dehydrogenase activity may occur when  reticulocytes or white blood cells are present in high  concentrations.  Performed By: [facility]  [address]     Component 10/10/22  12:32 PM   HLA-B27 NEGATIVE   Interpretive Comment HLA B27 NEGATIVE  Approximately 90% of Am     omponent  Ref Range & Units 3/2/23  1:48 PM   QuantiFERON Incubation Incubation performed.   QuantiFERON-TB Gold Plus  Negative Negative   Comment: No response to M tuberculosis antigens detected.  Infection with M tuberculosis is unlikely, but high risk  individuals should be considered for additional testing       Lab Results   Component Value Date    WBC 11.2 (H) 11/04/2024    RBC 4.92 11/04/2024    HGB 12.7 11/04/2024    HCT 39.5 11/04/2024    MCV 80.3 11/04/2024    MCH 25.8 (L) 11/04/2024    MCHC 32.2 11/04/2024    RDW 15.6 11/04/2024    .0 11/04/2024       No components found for: \"RELY\", \"NMET\", \"MYEL\", \"PROMY\", \"ROSE\", \"ABSNEUTS\", \"ABSBANDS\", \"ABMM\", \"ABMY\", \"ABPM\", \"ABBL\"      Lab Results   Component Value Date     11/04/2024    K 4.2 11/04/2024    CO2 29.0 11/04/2024    BUN 10 11/04/2024    ALB 4.6 11/04/2024    AST 15 11/04/2024    ALT 31 11/04/2024          No components found for: \"ESRWESTERGRN\"       Lab Results   Component Value Date    CRP <0.40 11/04/2024         No results found for: \"COLOR\", \"CLARITY\", \"UROBILINOGEN\", \"YEAST\"  @LABRCNTIP(RF,B12)@      [unfilled]    Imaging:  No results found.    Assessment & Plan:      18-year-old woman comes in for further evaluation for joint swelling stiffness positive RF CCP greater than 150 elevated ESR CRP    Seropositive rheumatoid arthritis  Mild osteoarthritis multiple joint  Noncompliance to treatment    Minimal synovitis on exam  Patient stopped treatment since she was doing well  Discussed importance of compliance in order to maintain her rheumatoid arthritis and prevent irreversible joint damage and debility  Restart sulfasalazine but start lower dose of 500 twice a day  Wean off prednisone likely causing her mood disturbance no major side effect  Reminded patient to get labs today and every 8 to 12 weeks to monitor for drug toxicity  Mild  hyperuricemia without history of gouty arthritis we will monitor  X-rays of the hands and feet showing mild arthritic changes no erosions from 2024   Chest x-ray normal 2024     Minimal improvement on hydroxychloroquine after 1 month could consider addition of this in the future    Since she is sexually active and only doing condoms we will opt for sulfasalazine as a next step    Discussed the diagnosis of rheumatoid arthritis in detail written information given to the patient as well and both steroids and sulfasalazine in the meantime    Education and counseling provided to patient.  Instructed patient to call my office or seek medical attention immediately if symptoms worsen. Risks and side effects of medications and diagnosis discussed in detail and patient was given written information on new prescribed medications.    Return to clinic:  Return in about 3 months (around 5/3/2025).    Christine Strickland MD  8/29/2024

## 2025-03-28 ENCOUNTER — TELEPHONE (OUTPATIENT)
Dept: RHEUMATOLOGY | Facility: CLINIC | Age: 20
End: 2025-03-28

## 2025-03-28 NOTE — TELEPHONE ENCOUNTER
Patient called and said she received the [employer] for the Release of Information but she would prefer to have them uploaded to her MyChart once they are completed.

## 2025-03-28 NOTE — TELEPHONE ENCOUNTER
Received Family Medical Leave Act forms in the forms department via [employer]. Sent [employer] message for Release of Information Authorization. Created e-mail and logged for processing.

## 2025-04-04 NOTE — TELEPHONE ENCOUNTER
Called patient to obtain details, someone picked up phone, didn't say anything, no message left. If patient should call back please get details/task provider

## 2025-04-09 NOTE — TELEPHONE ENCOUNTER
Patient called back to give details. Patient asked for a delay letter.    Type of Leave: Family Medical Leave Act INTERMITTENT  Reason for Leave:ARTHRITIS   Start date of leave:4/10/25  End date of leave:4/10/26  How many flare ups per month/length?:1-4 lasting 1-2 days each  How many appts per month/length?:   Was Fee and Turnaround info Given?:

## 2025-04-09 NOTE — TELEPHONE ENCOUNTER
Dr Strickland,    The patient is asking for intermittent Family Medical Leave Act for arthritis flare ups.    1-4 per month lasting 1-2 days each. Do you support?    Thanks,    K.C.  Forms Department

## 2025-04-11 ENCOUNTER — HOSPITAL ENCOUNTER (OUTPATIENT)
Age: 20
Discharge: HOME | End: 2025-04-11
Payer: COMMERCIAL

## 2025-04-11 VITALS
DIASTOLIC BLOOD PRESSURE: 77 MMHG | RESPIRATION RATE: 18 BRPM | OXYGEN SATURATION: 98 % | HEART RATE: 95 BPM | SYSTOLIC BLOOD PRESSURE: 135 MMHG | TEMPERATURE: 99 F

## 2025-04-11 DIAGNOSIS — U07.1 COVID-19: Primary | ICD-10-CM

## 2025-04-11 LAB
POCT INFLUENZA A: NEGATIVE
POCT INFLUENZA B: NEGATIVE
SARS-COV-2 RNA RESP QL NAA+PROBE: DETECTED

## 2025-04-11 PROCEDURE — 87502 INFLUENZA DNA AMP PROBE: CPT | Performed by: NURSE PRACTITIONER

## 2025-04-11 PROCEDURE — 99213 OFFICE O/P EST LOW 20 MIN: CPT

## 2025-04-11 PROCEDURE — 99203 OFFICE O/P NEW LOW 30 MIN: CPT

## 2025-04-11 RX ORDER — NIRMATRELVIR AND RITONAVIR 300-100 MG
KIT ORAL
Qty: 30 TABLET | Refills: 0 | Status: SHIPPED | OUTPATIENT
Start: 2025-04-11 | End: 2025-04-16

## 2025-04-11 NOTE — ED PROVIDER NOTES
Patient Seen in: [facility]    History     Chief Complaint   Patient presents with    Cough/URI     Stated Complaint: Fever    Subjective:   19-year-old female presents to immediate care for cough, congestion and fever.  Patient states symptoms started Tuesday, she reports increased fatigue, denies any shortness of breath or chest pain      Objective:     History reviewed. No pertinent past medical history.           History reviewed. No pertinent surgical history.             Social History     Socioeconomic History    Marital status: Single   Tobacco Use    Smoking status: Some Days     Types: Cigarettes    Smokeless tobacco: Never   Substance and Sexual Activity    Alcohol use: Not Currently    Drug use: Never     Social Drivers of Health     Food Insecurity: Not on File (9/26/2024)    Received from [employer]    Food [employer]     Food: 0   Transportation Needs: Not on File (2/24/2023)    Received from [employer]    Transportation Needs     Transportation: 0    [facility]              Review of Systems   Constitutional: Negative.    Respiratory: Negative.     Cardiovascular: Negative.    Gastrointestinal: Negative.    Skin: Negative.    Neurological: Negative.        Positive for stated complaint: Fever  Other systems are as noted in HPI.  Constitutional and vital signs reviewed.      All other systems reviewed and negative except as noted above.    Physical Exam     ED Triage Vitals [04/11/25 1255]   /77   Pulse 95   Resp 18   Temp 98.5 °F (36.9 °C)   Temp src    SpO2 98 %   O2 Device None (Room air)       Current Vitals:   Vital Signs  BP: 135/77  Pulse: 95  Resp: 18  Temp: 98.5 °F (36.9 °C)    Oxygen Therapy  SpO2: 98 %  O2 Device: None (Room air)        Physical Exam  Vitals and nursing note reviewed.   Constitutional:       General: She is not in acute distress.  HENT:      Head: Normocephalic.   Cardiovascular:      Rate and Rhythm: Normal rate.   Pulmonary:       Effort: Pulmonary effort is normal.   Musculoskeletal:         General: Normal range of motion.   Skin:     General: Skin is warm and dry.   Neurological:      General: No focal deficit present.      Mental Status: She is alert and oriented to person, place, and time.           ED Course     Labs Reviewed   RAPID SARS-COV-2 BY PCR - Abnormal; Notable for the following components:       Result Value    Rapid SARS-CoV-2 by PCR Detected (*)     All other components within normal limits   POCT FLU TEST - Normal    Narrative:     This assay is a rapid molecular in vitro test utilizing nucleic acid amplification of influenza A and B viral RNA.         MDM        Medical Decision Making  Pertinent Labs & Imaging studies reviewed. (See chart for details).  Patient coming in with cough, congestion, body aches and fever.   Differential diagnosis includes, COVID, viral URI     Patient is comfortable with plan of care.     Overall Pt looks good. Non-toxic, well-hydrated and in no respiratory distress. Vital signs are reassuring. Exam is reassuring. I do not believe pt requires and additional diagnostic studies or intervention. I believe pt can be discharged home to continue evaluation as an outpatient. Follow-up provider given. Discharge instructions given and reviewed. Return for any problems. All understand and agrees with the plan.        Problems Addressed:  COVID-19: acute illness or injury    Amount and/or Complexity of Data Reviewed  Labs: ordered. Decision-making details documented in ED Course.    Risk  OTC drugs.  Prescription drug management.        Disposition and Plan     Clinical Impression:  1. COVID-19         Disposition:  Discharge  4/11/2025  1:32 pm    Follow-up:  Avinash Riggs MD  [address] [place] [id number]  [phone]                Medications Prescribed:  Discharge Medication List as of 4/11/2025  1:38 PM        START taking these medications    Details   nirmatrelvir-ritonavir  (PAXLOVID, 300/100,) 300-100 MG Oral Tablet Therapy Pack Take two nirmatrelvir tablets (300mg) with one ritonavir tablet (100mg) together twice daily for 5 days., Normal, Disp-30 tablet, R-0             Supplementary Documentation:

## 2025-04-15 NOTE — TELEPHONE ENCOUNTER
Dr. Strickland,    Please sign off on form if you agree to: Family Medical Leave Act for Arthritis start 4/10/25-4/10/26    -Signature page will be the first page scanned  -From your Inbasket, Highlight the patient and click Chart   -Double click the 3/28/25 Forms Completion telephone encounter  -Scroll down to the Media section   -Click the blue Hyperlink: Family Medical Leave Act Dr. Strickland 4/15/25  -Click Acknowledge located in the top right ribbon/menu   -Drag the mouse into the blank space of the document and a + sign will appear. Left click to   electronically sign the document.  -Once signed, simply exit out of the screen and you signature will be saved.     Thank you,  [place]

## 2025-04-24 NOTE — TELEPHONE ENCOUNTER
Patient sent a [employer] message to check the status of her forms. I see forms were already signed. I uploaded a copy of forms to [employer] for patient. No authorization on file.

## 2025-05-01 PROBLEM — M05.79 RHEUMATOID ARTHRITIS INVOLVING MULTIPLE SITES WITH POSITIVE RHEUMATOID FACTOR (HCC): Status: ACTIVE | Noted: 2022-10-10

## 2025-05-01 PROBLEM — M35.7 BENIGN HYPERMOBILITY SYNDROME: Status: ACTIVE | Noted: 2022-10-10

## 2025-05-01 PROBLEM — E79.0 HYPERURICEMIA W/O SIGNS OF INFLAM ARTHRIT AND TOPHACEOUS DIS: Status: ACTIVE | Noted: 2022-10-10

## 2025-05-01 PROBLEM — M13.0 POLYARTHRITIS: Status: ACTIVE | Noted: 2022-10-10

## 2025-05-01 PROBLEM — R87.612 LGSIL ON PAP SMEAR OF CERVIX: Status: ACTIVE | Noted: 2025-01-28

## 2025-05-01 PROBLEM — E55.9 VITAMIN D DEFICIENCY: Status: ACTIVE | Noted: 2022-01-19

## 2025-05-01 PROBLEM — M72.2 PLANTAR FASCIITIS: Status: ACTIVE | Noted: 2022-10-10

## 2025-05-01 PROBLEM — U07.1 COVID-19: Status: ACTIVE | Noted: 2025-04-11

## 2025-05-01 NOTE — H&P
[facility]  Obstetrics and Gynecology   History & Physical  NEW    The 21st Century Cures Act makes medical notes like these available to patients in the interest of transparency. Please be advised this is a medical document. Medical documents are intended to carry relevant information, facts as evident, and the clinical opinion of the practitioner. The medical note is intended as peer to peer communication and may appear blunt or direct. It is written in medical language and may contain abbreviations or verbiage that are unfamiliar.     Chief complaint:   Chief Complaint   Patient presents with    Abnormal Pap     Referral from PCP for abnormal pap.     Contraception     Patient would like OCP    New Patient     Establish care     Subjective:     HPI: Kath Echevarria is a 19 year old  with Patient's last menstrual period was 04/15/2025 (exact date).   Referred by PCP for abnormal pap. Asking to get on an OCP    Chaperone for exam declines    PMH Obesity, Seropositive rheumatoid arthritis, Mild osteoarthritis multiple joint, Noncompliance to treatment for RA, Hyperuricemia, Mild depression, +Insomnia, vitamin D deficiency.     25 LSIL, +HPV - PCP Avinash Riggs MD    This was patient's 1st pap smear.     +Tobacco use - some day user of e-cigarettes. It takes her about 1 month to use a cartridge  +Prednisone for long time - took it for about 2 months as needed. Last took in Summer 2024.   Vit D deficient - has not been taking maintenance vitamin D     Non compliant with RA drugs & monitoring per EPIC notes. Saw Rheum 2/3/25  Supposed to be on Sulfasalazine  Pt reports she stopped it about 1 month ago. Has been watching diet & exercising more & bones aren't hurting anymore. If raining or very humid will have flare ups. Walking helps to loosen things up.     Patient Care Team:  Avinash Riggs MD as PCP - General (Internal Medicine)  Christine Strickland MD (RHEUMATOLOGY)     Review of Systems    Constitutional:  Negative for unexpected weight change.        Working out - 2025   Walking   Some weights   Lower body - some weight machines.     Diet - trying to not drink soda or juice. No candies, chips. Reduced eating out. Last meal before 7-8 pm.     Has lost probably 20+ lb.    Eyes:  Negative for visual disturbance.        No glasses or contacts   Gastrointestinal:  Negative for abdominal pain, blood in stool, constipation and diarrhea.   Genitourinary:  Negative for dyspareunia, dysuria, frequency, hematuria, menstrual problem, pelvic pain, vaginal bleeding and vaginal discharge.   Skin:         Breasts - ok  Acne - N  Hirsutism - N   Neurological:  Negative for headaches.        No auras   Psychiatric/Behavioral:  Negative for dysphoric mood and sleep disturbance. The patient is not nervous/anxious.         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     Sleep 7 hr per night.   Not sure if snores but is sleeping with mouth open. New thing - noticing saliva on the pillow.      GYN Hx:   Menarche 12 x 28-30 days x 4-5 days x moderate flow x mild cramping.   Ibuprofen 600 mg once on the 1st days. Helps.     In 2025 took Plan B & had spotting for 2 days.     Patient's last menstrual period was 04/15/2025 (exact date).  Hx Prior Abnormal Pap: Yes  Pap Date: 23  Pap Result Notes: LSIL/HPV Positive    Coitarche 16 or 17  Relationship with current partner about 1+ years.     Living with parents & siblings.   Sexually active? Y  Dyspareunia? N  Postcoital bleeding? N    Contraception: condoms most of the time.   STDs: N  HPV vaccine N    Cervical cancer screenin25 LSIL, +HPV - PCP Avinash Riggs MD - 1st pap ever    Breast cancer screening:  Mammogram: N    Colon cancer screening:  Colonoscopy N    OB History:  OB History    Para Term  AB Living   0 0 0 0 0 0   SAB IAB Ectopic Multiple Live Births   0 0 0 0 0       Medications:  Current Outpatient Medications    Medication Sig Dispense Refill                  pantoprazole 20 MG Oral Tab EC Take 1 tablet (20 mg total) by mouth every morning before breakfast. 30 tablet 0      Prescriptions Prior to Admission[1]  Prior to admission med list:   Prescriptions Prior to Admission[2]  Inpatient Current Medication List:  Current Hospital Medications[3]  Scheduled Meds:   Scheduled Medications[4]  Continuous Infusions:   Medication Infusions[5]  PRN Medications:   PRN Medications[6]    All:  Allergies[7]    PMH:  Past Medical History:   Diagnosis Date    Benign hypermobility syndrome 10/10/2022    COVID-19 04/11/2025    Positive test 4/11/25      Depression 02/2025    mild depression 2/2025    Hyperuricemia w/o signs of inflam arthrit and tophaceous dis 10/10/2022    10/10/22 Uric acid 6.8    Insomnia 02/2025    LGSIL on Pap smear of cervix 01/28/2025 1/28/25 LSIL, +HPV - PCP Avinash Riggs MD. This was 1st pap ever    Morbid obesity with BMI of 40.0-44.9, adult ([facility]ly [facility]) 10/10/2022    Noncompliance 02/03/2025    Obesity (BMI 30-39.9) 05/02/2025    Osteoarthritis of multiple joints 08/29/2024    Plantar fasciitis 10/10/2022    Polyarthritis 10/10/2022    Rheumatoid arthritis involving multiple sites with positive rheumatoid factor ([facility]) 10/10/2022    Tobacco use 05/2025    Vitamin D deficiency 01/19/2022 01/19/22 Vit D 9.4,   11/29/23  Vit D 8.9        PSH:  Past Surgical History:   Procedure Laterality Date    Other surgical history Right 2013    tooth extraction - cavity - lower right side       Social History:  Social History     Socioeconomic History    Marital status: Single   Tobacco Use    Smoking status: Never    Smokeless tobacco: Never   Vaping Use    Vaping status: Some Days    Substances: Nicotine   Substance and Sexual Activity    Alcohol use: Yes     Comment: About 2 weekends per month. Mixed drinks - 3 at a time    Drug use: Not Currently     Types: Cannabis     Comment: Last marijuana 2021 approximately    Sexual  activity: Yes     Partners: Male     Birth control/protection: Condom     Social Drivers of Health     Food Insecurity: Not on File (9/26/2024)    Received from [facility]    Food Insecurity     Food: 0   Transportation Needs: Not on File (2/24/2023)    Received from [facility]    Transportation Needs     Transportation: 0   Stress: Not on File (2/24/2023)    Received from [facility]    Stress     Stress: 0    Received from [facility]        Tobacco:  Social History     Tobacco Use   Smoking Status Never   Smokeless Tobacco Never     E-Cigarettes/Vaping       Questions Responses    E-Cigarette Use Current Some Day User    Counseling Given Yes    Cartridges/Day 1 cartridge per month          E-Cigarette/Vaping Substances       Questions Responses    Nicotine Yes    THC No    CBD No    Flavoring No           Tobacco cessation counseling for 3-10 minutes (add E/M code #27794).       Family History:  Family History   Problem Relation Age of Onset    No Known Problems Mother     No Known Problems Father     No Known Problems Sister     No Known Problems Sister     No Known Problems Brother     Breast Cancer Neg     Ovarian Cancer Neg     Colon Cancer Neg     Birth Defects Neg     Genetic Disease Neg     Pancreatic Cancer Neg     Uterine Cancer Neg     Infertility Neg     Endometriosis Neg     Diabetes Neg     Thyroid disease Neg     Stroke Neg     Heart Attack Neg     Clotting Disorder Neg        Immunization History:  Immunization History   Administered Date(s) Administered    Covid-19 Vaccine Pfizer 30 mcg/0.3 ml 06/03/2021, 06/29/2021    DTAP 10/28/2004, 10/19/2007    DTAP/HEP B/IPV Combined 01/15/2004, 06/08/2004    FLUMIST NASAL 2 YR-49 YRS (78351) 10/26/2015    FLUZONE 6 months and older PFS 0.5 ml (12993) 02/23/2023    HEP A 11/14/2008, 08/27/2009    HEP B, Ped/Adol 09/23/2003    Hib, Unspecified Formulation 01/15/2004, 06/08/2004, 10/28/2004, 04/26/2005    IPV 04/26/2005, 10/19/2007    Influenza 10/19/2007,  11/14/2008    Intranasal (CPT=90660), Influenza Vaccine, Flu Clinic 09/28/2011    MMR 12/30/2004, 10/19/2007    Meningococcal-Menveo 2month-55yr 10/26/2015, 11/08/2021    Pneumococcal (Prevnar 7) 01/15/2004, 10/28/2004, 12/30/2004    TDAP 10/26/2015    Varicella 09/20/2005, 11/14/2008     Depression: Not at risk (5/2/2025)    PHQ-2     PHQ-2 Score: 1       Depression Screening (PHQ-2/PHQ-9): Over the LAST 2 WEEKS   Little interest or pleasure in doing things (over the last two weeks)?: Several days    Feeling down, depressed, or hopeless (over the last two weeks)?: Not at all    PHQ-2 SCORE: 1         [place] Suicide Severity Rating Scale Screener   In what setting is the screener performed?: an office visit  1. Have you wished you were dead or wished you could go to sleep and not wake up? (past 30 days): No  2. Have you actually had any thoughts of killing yourself? (past 30 days): No  6. Have you ever done anything, started to do anything, or prepared to do anything to end your life? (lifetime): No  Score and Suggested Actions - Office Visit: No Risk  Score and Intervention  Score and Suggested Actions - Office Visit: No Risk     Objective:     Vitals:    05/02/25 1250   BP: 102/60   Pulse: 104   Weight: 237 lb (107.5 kg)   Height: 65\"     Body mass index is 39.44 kg/m².    BP Readings from Last 10 Encounters:   05/05/25 110/70   05/02/25 102/60   04/11/25 135/77   02/03/25 120/70   11/04/24 118/58   08/29/24 110/76        Wt Readings from Last 10 Encounters:   05/05/25 236 lb (107 kg)   05/02/25 237 lb (107.5 kg)   02/03/25 247 lb (112 kg)   11/04/24 261 lb (118.4 kg)   08/29/24 260 lb (117.9 kg)       BMI Readings from Last 10 Encounters:   05/05/25 39.27 kg/m²   05/02/25 39.44 kg/m²   02/03/25 39.87 kg/m²   11/04/24 42.13 kg/m²   08/29/24 41.97 kg/m²         Physical Exam:  Physical Exam  Vitals and nursing note reviewed.   Constitutional:       Appearance: She is obese.   HENT:      Head: Normocephalic and  atraumatic.   Eyes:      Extraocular Movements: Extraocular movements intact.      Conjunctiva/sclera: Conjunctivae normal.   Neck:      Comments: No thyromegaly  Cardiovascular:      Rate and Rhythm: Normal rate and regular rhythm.      Heart sounds: No murmur heard.  Pulmonary:      Effort: Pulmonary effort is normal.      Breath sounds: Normal breath sounds.   Abdominal:      General: There is no distension.      Palpations: Abdomen is soft. There is no mass.      Tenderness: There is no abdominal tenderness. There is no guarding or rebound.      Hernia: No hernia is present.      Comments: Obese, +long dark hair on lower abdomen   Genitourinary:     Comments: VULVA: normal appearing vulva with no masses, tenderness or lesions  URETHRA: unremarkable   PERINEUM: intact    White speculum used  VAGINA: normal appearing vagina with normal color and discharge, no lesions   CERVIX: normal appearing cervix without discharge or lesions  UTERUS: deferred  ADNEXA: deferred  PELVIC FLOOR: mild hypertonicity, mild tenderness       Musculoskeletal:      Right lower leg: No edema.      Left lower leg: No edema.   Skin:     Comments: +some dark hair under chin  +hair thinning on scalp on top of head   Neurological:      General: No focal deficit present.      Mental Status: She is alert.   Psychiatric:         Mood and Affect: Mood normal.         Behavior: Behavior normal.         Thought Content: Thought content normal.         Judgment: Judgment normal.         Labs:  Lab Results   Component Value Date    WBC 11.2 (H) 11/04/2024    RBC 4.92 11/04/2024    HGB 12.7 11/04/2024    HCT 39.5 11/04/2024    MCV 80.3 11/04/2024    MCH 25.8 (L) 11/04/2024    MCHC 32.2 11/04/2024    RDW 15.6 11/04/2024    .0 11/04/2024        Lab Results   Component Value Date     (H) 11/04/2024    BUN 10 11/04/2024    CREATSERUM 0.89 11/04/2024    ANIONGAP 4 11/04/2024    CA 9.4 11/04/2024    OSMOCALC 284 11/04/2024    ALKPHO 101  2024    AST 15 2024    ALT 31 2024    BILT 0.5 2024    TP 7.8 2024    ALB 4.6 2024    GLOBULIN 3.2 2024     2024    K 4.2 2024     2024    CO2 29.0 2024       No results found for: \"CHOLEST\", \"TRIG\", \"HDL\", \"LDL\", \"VLDL\", \"TCHDLRATIO\", \"NONHDLC\", \"CHOLHDLRATIO\", \"CALCNONHDL\"     No results found for: \"T4F\", \"TSH\", \"TSHT4\"     No results found for: \"EAG\", \"A1C\"    Imaging:  No results found.     Assessment:     Patient Active Problem List    Diagnosis    General counseling and advice for contraceptive management    Obesity (BMI 30-39.9)    COVID-19     Positive test 25      Noncompliance w/medication treatment due to intermit use of medication    LGSIL on Pap smear of cervix     25 LSIL, +HPV - PCP Avinash Riggs MD      Encounter for drug therapy    Osteoarthritis of multiple joints    Rheumatoid arthritis involving multiple sites with positive rheumatoid factor (HCC)    Hyperuricemia w/o signs of inflam arthrit and tophaceous dis    Polyarthritis    Benign hypermobility syndrome    Plantar fasciitis    Vitamin D deficiency         Kath Echevarria is a 19 year old  female here for LSIL, +HPV on pap per PCP.  PMH Obesity, Seropositive rheumatoid arthritis, Mild osteoarthritis multiple joint, Noncompliance to treatment for RA, Hyperuricemia, Mild depression, +Insomnia, vitamin D deficiency.     She is not currently on any treatment for her RA. She requests to start an oral contraceptive. She was advised to avoid tobacco products & is starting HPV vaccination today.     Diagnoses and all orders for this visit:    LGSIL on Pap smear of cervix    Tobacco use  -     Smoking Cessation 3-10 minutes (34090)    HPV vaccine counseling  -     Gardasil 9 [73189]    Vitamin D deficiency    Screening examination for STD (sexually transmitted disease)  -     Chlamydia/Gc Amplification; Future    Rheumatoid arthritis involving multiple  sites with positive rheumatoid factor (HCC)    Need for HPV vaccination  -     Gardasil 9 [61974]    General counseling and advice for contraceptive management  -     Drospirenone-Ethinyl Estradiol (NEREYDA) 3-0.02 MG Oral Tab; Take 1 tablet by mouth daily.  -     Urine Preg Test [23312]    Encounter for tobacco use cessation counseling  -     Smoking Cessation 3-10 minutes (32478)         Plan:     1/28/25 LSIL, +HPV - PCP Avinash Riggs MD   -cervix appears normal on exam today, 5/2/25   -ASCCP pap guidelines reviewed   -recommendation for management varies between immunocompromised vs immunocompetent adults  -reviewed her RA means she has significant immune system dysfunction. Not currently on prednisone or biologic. She is supposed to be on sulfasalazine (which she stopped on her own) which is a DMARD so could potentially be considered immunosuppressive  -at this time, she is on no medications for her RA, so could be considered immunocompetent & plan repeat pap in 1 year from the abnormal pap (would be due after 1/28/26)  -though if she is immunocompromised, would recommend colposcopy with possible biopsies.   -will message her rheumatologist about whether or not she would consider patient \"immunocompromised\"     AUB/spotting   -Took Plan B last month (4/2025)  -did get period since then   -urine preg NEG 5/2/2025     HPV vaccine strong encouraged  -pt desires 1st dose today, 5/2/25    Tobacco use - discussed link of tobacco with persistent HPV infection with higher risk for dysplasia/cancer.   Counseling given: Yes     Vit D repletion strongly encouraged due to anti-inflammatory effects      RA   -not currently taking her prescribed therapy  -Follow up rheumatologist.     STD screening  -does not look like GC/CT was done per PCP at time of 1/28/25 pap & vaginitis swab  -GC/CT off urine today 5/2/25      Contraception requested by patient   -various options reviewed. She is not interested in IUD. She is worried about  potential for weight gain   -suspect patient may have mild/borderline PCOS. +Hirsutism of lower abdomen  -discussed varying effects on mood, BP, clotting factors  -patient asking to start Pushpa due to friend on it with good effects. We discussed this is considered a higher VTE risk OCP & could try something like Loestrin 1/20 instead. Patient would like to try the Pushpa    Condoms encouraged.    Plans for kids? Not right now   Carrier screening - deferred   Concern for hereditary cancer? N    BMI Obesity  -healthy diet & exercise encouraged - has been losing weight on her own    RTC for pap smear 1 year from pap with PCP on 1/28/25 LSIL, +HPV (due after 1/28/26). Await rheumatologist opinion regarding her immune state.     Shannon Perez MD  [facility]           [1] (Not in a hospital admission)   [2] (Not in a hospital admission)   [3]   No current facility-administered medications for this visit.   [4] [5] [6] [7] No Known Allergies

## 2025-05-01 NOTE — PATIENT INSTRUCTIONS
1st day of period = day 1 of cycle.   Start birth control pill on one of the first 5 days of the cycle & then just follow the pill pack.     Avoid tobacco   HPV vaccine  Get vitamin D level up & maintain it    Vitamin D3 4000 units per day - in the morning    Patient Information about GARDASIL®9 (pronounced “wsob-[id number]-[id number] n?n”)  (Human Papillomavirus 9-valent Vaccine, Recombinant)    IT IS RECOMMENDED THAT YOU CHECK WITH YOUR INSURANCE ABOUT COVERAGE FOR THIS VACCINE PRIOR TO ITS ADMINISTRATION    Read this information with care before getting GARDASIL®9. You or your child (the person getting  GARDASIL 9) will need 2 or 3 doses of the vaccine, depending on how old you are. It is important to read  this information before getting each dose. This information does not take the place of talking with your  health care professional about GARDASIL 9.    What is GARDASIL 9?    GARDASIL 9 is a vaccine (injection/shot) given to individuals 9 through 45 years of age to help protect  against diseases caused by some types of Human Papillomavirus (HPV).    What diseases can GARDASIL 9 help protect against?    In girls and women 9 through 45 years of age, GARDASIL 9 helps protect against:  ? Cervical cancer  ? Vulvar and vaginal cancers  ? Anal cancer  ? Certain head and neck cancers, such as throat and back of mouth cancers  ? Precancerous cervical, vulvar, vaginal and anal lesions  ? Genital warts  In boys and men 9 through 45 years of age, GARDASIL 9 helps protect against:  ? Anal cancer  ? Certain head and neck cancers, such as throat and back of mouth cancers  ? Precancerous anal lesions  ? Genital warts    These diseases may have many causes, including HPV Types 6, 11, 16, 18, 31, 33, 45, 52, and 58.  GARDASIL 9 only protects against diseases caused by these nine types of HPV.  People cannot get HPV or any of these diseases from GARDASIL 9.    What important information about GARDASIL 9 should I know?    GARDASIL 9:  ? Does  not remove the need for screening for cervical, vulvar, vaginal, anal, and certain head and  neck cancers, such as throat and back of mouth cancers as recommended by a health care  professional; women should still get routine cervical cancer screening.  ? Does not protect the person getting GARDASIL 9 from a disease that is caused by other types of  HPV, other viruses or bacteria.  ? Does not treat HPV infection.  ? Does not protect the person getting GARDASIL 9 from HPV types that he/she may already have.  GARDASIL 9 may not fully protect each person who gets it.    Who should not get GARDASIL 9?    Anyone with an allergic reaction to:  ? A previous dose of GARDASIL 9  ? A previous dose of GARDASIL®  ? Yeast (severe allergic reaction)  ? Amorphous aluminum hydroxyphosphate sulfate  ? Polysorbate 80    What should I tell the health care professional before getting GARDASIL 9?    Tell the health care professional if you or your child (the person getting GARDASIL 9):  ? Are pregnant or planning to get pregnant.  ? Have immune problems, like HIV or cancer.  ? Take medicines that affect the immune system.  ? Have a fever over 100°F (37.8°C).  ? Might have had an allergic reaction to a previous dose of GARDASIL 9 or GARDASIL.  ? Take any medicines, even those you can buy over the counter.  The health care professional will help decide if you or your child should get the vaccine.    How is GARDASIL 9 given?    GARDASIL 9 is a shot that is usually given in the arm muscle. GARDASIL 9 may be given as 2 or 3  shots.  For persons who are   9 through 14 years old   2-shots* Dose 1: first shot  Dose 2: second shot given between 6 and 12 months  after the first shot    or 3-shots** Dose 1: first shot  Dose 2: second shot given 2 months after the first shot  Dose 3: third shot given 6 months after the first shot    15 through 45 years old 3-shots   Dose 1: first shot  Dose 2: second shot given 2 months after the first shot  Dose  3: third shot given 6 months after the first shot    *If the second shot is given earlier than 5 months after the first shot, you will need to get a third shot at  least 4 months after the second shot was given.    **The need to use a 3-dose schedule instead of a 2-dose schedule will be determined by your health care  Professional.    Make sure that you or your child gets all doses recommended by your health care professional so that  you or your child gets the best protection. If the person getting GARDASIL 9 misses a dose, tell the health  care professional and they will decide when to give the missed dose. It is important that you follow the  instructions of your health care professional regarding return visits for the follow-up doses.  Fainting can happen after getting an HPV vaccine. Sometimes people who faint can fall and hurt  themselves. For this reason, the health care professional may ask the person getting GARDASIL 9 to sit  or lie down for 15 minutes after getting the vaccine. Some people who faint might shake or become stiff.  The health care professional may need to treat the person getting GARDASIL 9.    Can I get GARDASIL 9 if I have already gotten GARDASIL?    If you have already gotten GARDASIL, talk to your health care professional to see if GARDASIL 9 is right  for you.    Can I get GARDASIL 9 with other vaccines?    GARDASIL 9 can be given at the same time as:  ? Menactra [Meningococcal (Groups A, C, Y and W-135) Polysaccharide Diphtheria Toxoid  Conjugate Vaccine]  ? Adacel [Tetanus Toxoid, Reduced Diphtheria Toxoid and Acellular Pertussis Vaccine Adsorbed  (Tdap)]    What are the possible side effects of GARDASIL 9?    The most common side effects seen with GARDASIL 9 are:  ? pain, swelling, redness, itching, bruising, bleeding, and a lump where you got the shot  ? headache  ? fever  ? nausea  ? dizziness  ? tiredness  ? diarrhea  ? abdominal pain  ? sore throat    Studies show that there was  more swelling where the shot was given when GARDASIL 9 was given at  the same time as Menactra and/or Adacel.    Tell the health care professional if you have any of these problems because these may be signs of an  allergic reaction:  ? difficulty breathing  ? wheezing (bronchospasm)  ? hives  ? rash    Additional side effects that have been reported during general use for GARDASIL 9 are shown below.  Side effects reported during the general use of GARDASIL are also shown below. GARDASIL side effects  are reported as they may be relevant to GARDASIL 9 since the vaccines are similar in composition.  GARDASIL 9  ? vomiting  ? hives    Additionally, these side effects have been seen with the general use of GARDASIL.  ? swollen glands (neck, armpit, or groin)  ? joint pain  ? unusual tiredness, weakness, or confusion  ? chills  ? generally feeling unwell  ? leg pain  ? shortness of breath  ? chest pain  ? aching muscles  ? muscle weakness  ? seizure  ? bad stomach ache  ? bleeding or bruising more easily than normal  ? skin infection    You should contact your health care professional right away if you get any symptoms that bother you.  For a more complete list of side effects, ask the health care professional.    Call your health care professional for medical advice about side effects. You may also report any side  effects to your doctor or directly to Vaccine Adverse Event Reporting System (VAERS). The VAERS [facility] number is 1-657.386.2391 or report online to www.vaers.hhs.gov.    GARDASIL 9 was not studied in women who knew they were pregnant. A pregnancy registry is available.  You are encouraged to contact the registry as soon as you become aware of your pregnancy by calling 1- [phone], or ask your health care professional to contact the registry for you.    What is in GARDASIL 9?    GARDASIL 9 contains:  ? Proteins of HPV Types 6, 11, 16, 18, 31, 33, 45, 52, and 58  ? Amorphous aluminum hydroxyphosphate  sulfate  ? Yeast protein  ? Sodium chloride  ? L-histidine  ? Polysorbate 80  ? Sodium borate  ? Water    This document is a summary of information about GARDASIL 9.    To learn more about GARDASIL 9, please talk to the health care professional or visit  [email or web address].    For patent information: [email or web address]  The trademarks depicted herein are owned by their respective companies.  Copyright © 6755-6690 [employer] a subsidiary of [employer]  All rights reserved.  Revised: 06/2020  [id number]

## 2025-05-02 ENCOUNTER — OFFICE VISIT (OUTPATIENT)
Facility: CLINIC | Age: 20
End: 2025-05-02
Payer: COMMERCIAL

## 2025-05-02 VITALS
HEIGHT: 65 IN | SYSTOLIC BLOOD PRESSURE: 102 MMHG | WEIGHT: 237 LBS | BODY MASS INDEX: 39.49 KG/M2 | DIASTOLIC BLOOD PRESSURE: 60 MMHG | HEART RATE: 104 BPM

## 2025-05-02 DIAGNOSIS — Z23 NEED FOR HPV VACCINATION: ICD-10-CM

## 2025-05-02 DIAGNOSIS — Z71.85 HPV VACCINE COUNSELING: ICD-10-CM

## 2025-05-02 DIAGNOSIS — Z11.3 SCREENING EXAMINATION FOR STD (SEXUALLY TRANSMITTED DISEASE): ICD-10-CM

## 2025-05-02 DIAGNOSIS — R87.612 LGSIL ON PAP SMEAR OF CERVIX: Primary | ICD-10-CM

## 2025-05-02 DIAGNOSIS — Z71.6 ENCOUNTER FOR TOBACCO USE CESSATION COUNSELING: ICD-10-CM

## 2025-05-02 DIAGNOSIS — M05.79 RHEUMATOID ARTHRITIS INVOLVING MULTIPLE SITES WITH POSITIVE RHEUMATOID FACTOR (HCC): ICD-10-CM

## 2025-05-02 DIAGNOSIS — E55.9 VITAMIN D DEFICIENCY: ICD-10-CM

## 2025-05-02 DIAGNOSIS — Z72.0 TOBACCO USE: ICD-10-CM

## 2025-05-02 DIAGNOSIS — Z30.09 GENERAL COUNSELING AND ADVICE FOR CONTRACEPTIVE MANAGEMENT: ICD-10-CM

## 2025-05-02 PROBLEM — E66.9 OBESITY (BMI 30-39.9): Status: ACTIVE | Noted: 2025-05-02

## 2025-05-02 LAB
CONTROL LINE PRESENT WITH A CLEAR BACKGROUND (YES/NO): YES YES/NO
KIT LOT #: NORMAL NUMERIC
PREGNANCY TEST, URINE: NEGATIVE

## 2025-05-02 PROCEDURE — 87491 CHLMYD TRACH DNA AMP PROBE: CPT | Performed by: OBSTETRICS & GYNECOLOGY

## 2025-05-02 PROCEDURE — 87591 N.GONORRHOEAE DNA AMP PROB: CPT | Performed by: OBSTETRICS & GYNECOLOGY

## 2025-05-02 RX ORDER — DROSPIRENONE AND ETHINYL ESTRADIOL 0.02-3(28)
1 KIT ORAL DAILY
Qty: 84 TABLET | Refills: 3 | Status: SHIPPED | OUTPATIENT
Start: 2025-05-02 | End: 2026-05-02

## 2025-05-05 ENCOUNTER — TELEPHONE (OUTPATIENT)
Facility: CLINIC | Age: 20
End: 2025-05-05

## 2025-05-05 ENCOUNTER — OFFICE VISIT (OUTPATIENT)
Dept: RHEUMATOLOGY | Facility: CLINIC | Age: 20
End: 2025-05-05
Payer: COMMERCIAL

## 2025-05-05 ENCOUNTER — LAB ENCOUNTER (OUTPATIENT)
Dept: LAB | Age: 20
End: 2025-05-05
Attending: INTERNAL MEDICINE
Payer: COMMERCIAL

## 2025-05-05 VITALS
TEMPERATURE: 98 F | HEART RATE: 90 BPM | OXYGEN SATURATION: 97 % | SYSTOLIC BLOOD PRESSURE: 110 MMHG | DIASTOLIC BLOOD PRESSURE: 70 MMHG | BODY MASS INDEX: 39.32 KG/M2 | HEIGHT: 65 IN | WEIGHT: 236 LBS | RESPIRATION RATE: 16 BRPM

## 2025-05-05 DIAGNOSIS — E55.9 VITAMIN D DEFICIENCY: ICD-10-CM

## 2025-05-05 DIAGNOSIS — M05.79 RHEUMATOID ARTHRITIS INVOLVING MULTIPLE SITES WITH POSITIVE RHEUMATOID FACTOR (HCC): ICD-10-CM

## 2025-05-05 DIAGNOSIS — M15.0 PRIMARY OSTEOARTHRITIS INVOLVING MULTIPLE JOINTS: ICD-10-CM

## 2025-05-05 DIAGNOSIS — E66.9 OBESITY (BMI 30-39.9): ICD-10-CM

## 2025-05-05 DIAGNOSIS — E79.0 HYPERURICEMIA W/O SIGNS OF INFLAM ARTHRIT AND TOPHACEOUS DIS: ICD-10-CM

## 2025-05-05 DIAGNOSIS — M05.79 RHEUMATOID ARTHRITIS INVOLVING MULTIPLE SITES WITH POSITIVE RHEUMATOID FACTOR (HCC): Primary | ICD-10-CM

## 2025-05-05 DIAGNOSIS — Z91.148 NONCOMPLIANCE W/MEDICATION TREATMENT DUE TO INTERMIT USE OF MEDICATION: ICD-10-CM

## 2025-05-05 PROBLEM — Z30.09 GENERAL COUNSELING AND ADVICE FOR CONTRACEPTIVE MANAGEMENT: Status: ACTIVE | Noted: 2025-05-05

## 2025-05-05 LAB
ALBUMIN SERPL-MCNC: 5 G/DL (ref 3.2–4.8)
ALBUMIN/GLOB SERPL: 1.4 {RATIO} (ref 1–2)
ALP LIVER SERPL-CCNC: 102 U/L (ref 52–144)
ALT SERPL-CCNC: 21 U/L (ref 10–49)
ANION GAP SERPL CALC-SCNC: 8 MMOL/L (ref 0–18)
AST SERPL-CCNC: 20 U/L (ref ?–34)
BASOPHILS # BLD AUTO: 0.05 X10(3) UL (ref 0–0.2)
BASOPHILS NFR BLD AUTO: 0.6 %
BILIRUB SERPL-MCNC: 0.8 MG/DL (ref 0.3–1.2)
BUN BLD-MCNC: 9 MG/DL (ref 9–23)
C TRACH DNA SPEC QL NAA+PROBE: NEGATIVE
CALCIUM BLD-MCNC: 10.1 MG/DL (ref 8.7–10.6)
CHLORIDE SERPL-SCNC: 105 MMOL/L (ref 98–112)
CO2 SERPL-SCNC: 25 MMOL/L (ref 21–32)
CREAT BLD-MCNC: 0.59 MG/DL (ref 0.55–1.02)
EGFRCR SERPLBLD CKD-EPI 2021: 131 ML/MIN/1.73M2 (ref 60–?)
EOSINOPHIL # BLD AUTO: 0.05 X10(3) UL (ref 0–0.7)
EOSINOPHIL NFR BLD AUTO: 0.6 %
ERYTHROCYTE [DISTWIDTH] IN BLOOD BY AUTOMATED COUNT: 14.7 %
ERYTHROCYTE [SEDIMENTATION RATE] IN BLOOD: 62 MM/HR (ref 0–20)
FASTING STATUS PATIENT QL REPORTED: YES
GLOBULIN PLAS-MCNC: 3.6 G/DL (ref 2–3.5)
GLUCOSE BLD-MCNC: 90 MG/DL (ref 70–99)
HCT VFR BLD AUTO: 39.5 % (ref 35–48)
HGB BLD-MCNC: 12.6 G/DL (ref 12–16)
IMM GRANULOCYTES # BLD AUTO: 0.06 X10(3) UL (ref 0–1)
IMM GRANULOCYTES NFR BLD: 0.7 %
LYMPHOCYTES # BLD AUTO: 1.76 X10(3) UL (ref 1–4)
LYMPHOCYTES NFR BLD AUTO: 20.1 %
MCH RBC QN AUTO: 25.7 PG (ref 26–34)
MCHC RBC AUTO-ENTMCNC: 31.9 G/DL (ref 31–37)
MCV RBC AUTO: 80.6 FL (ref 80–100)
MONOCYTES # BLD AUTO: 0.92 X10(3) UL (ref 0.1–1)
MONOCYTES NFR BLD AUTO: 10.5 %
N GONORRHOEA DNA SPEC QL NAA+PROBE: NEGATIVE
NEUTROPHILS # BLD AUTO: 5.92 X10 (3) UL (ref 1.5–7.7)
NEUTROPHILS # BLD AUTO: 5.92 X10(3) UL (ref 1.5–7.7)
NEUTROPHILS NFR BLD AUTO: 67.5 %
OSMOLALITY SERPL CALC.SUM OF ELEC: 284 MOSM/KG (ref 275–295)
PLATELET # BLD AUTO: 407 10(3)UL (ref 150–450)
POTASSIUM SERPL-SCNC: 4.2 MMOL/L (ref 3.5–5.1)
PROT SERPL-MCNC: 8.6 G/DL (ref 5.7–8.2)
RBC # BLD AUTO: 4.9 X10(6)UL (ref 3.8–5.3)
SODIUM SERPL-SCNC: 138 MMOL/L (ref 136–145)
URATE SERPL-MCNC: 7.7 MG/DL (ref 3.1–7.8)
VIT D+METAB SERPL-MCNC: 10 NG/ML (ref 30–100)
WBC # BLD AUTO: 8.8 X10(3) UL (ref 4–11)

## 2025-05-05 PROCEDURE — 3078F DIAST BP <80 MM HG: CPT | Performed by: INTERNAL MEDICINE

## 2025-05-05 PROCEDURE — 82306 VITAMIN D 25 HYDROXY: CPT

## 2025-05-05 PROCEDURE — 85025 COMPLETE CBC W/AUTO DIFF WBC: CPT

## 2025-05-05 PROCEDURE — 3074F SYST BP LT 130 MM HG: CPT | Performed by: INTERNAL MEDICINE

## 2025-05-05 PROCEDURE — 99214 OFFICE O/P EST MOD 30 MIN: CPT | Performed by: INTERNAL MEDICINE

## 2025-05-05 PROCEDURE — 80053 COMPREHEN METABOLIC PANEL: CPT

## 2025-05-05 PROCEDURE — 84550 ASSAY OF BLOOD/URIC ACID: CPT

## 2025-05-05 PROCEDURE — 85652 RBC SED RATE AUTOMATED: CPT

## 2025-05-05 PROCEDURE — 3008F BODY MASS INDEX DOCD: CPT | Performed by: INTERNAL MEDICINE

## 2025-05-05 PROCEDURE — 36415 COLL VENOUS BLD VENIPUNCTURE: CPT

## 2025-05-05 RX ORDER — SULFASALAZINE 500 MG/1
500 TABLET ORAL 2 TIMES DAILY
Qty: 60 TABLET | Refills: 1 | Status: CANCELLED | OUTPATIENT
Start: 2025-05-05

## 2025-05-05 NOTE — TELEPHONE ENCOUNTER
OB/GYN    Per personal communication with rheumatologist Christine Strickland MD (RHEUMATOLOGY)   Sulfasalazine not thought of as immunosuppressant like the other medications are  Ok to repeat pap in 1 year     Shannon Perez MD

## 2025-05-05 NOTE — PROGRESS NOTES
[facility] ([place], [place]), [address] [place] [address]      Consult     Kath Echevarria Patient Status:  No patient class for patient encounter    2005 MRN [id number]   Location [facility] ([place], [place]), [address] [place] [address] Attending No att. providers found   Hosp Day # 0 PCP Avinash Riggs MD     Referring Provider: PCP    Reason for Consultation:   Component  Ref Range & Units 23  5:38 PM   RA LATEX TURBID.  <14.0 IU/mL 74.0 High      Component  Ref Range & Units 23  5:38 PM   C-REACTIVE PROTEIN, QUANT  0 - 10 mg/L 13 High      Component 23  5:38 PM   ANTINUCLEAR ANTIBODIES, IFA Positive Abnormal    Comment:                    Component  Ref Range & Units 10/10/22 12:32 PM   Cyclic Citrullinated Peptide Antibody  <=19 Units 140 High    Comment: Approximately 70% of patients with Rheumatoid Arthritis(RA) are positive for CCP antibody, while only 2% of random blood donors are positive.  The diagnostic value of CCP antibody i     omponent  Ref Range & Units 10/10/22 12:32 PM   Uric Acid  2.6 - 5.9 mg/dL 6.8 High    Comment: The optimal level of uric acid in patients with gout or uric acid stone formation should be 6.0 mg/dL or less and      Subjective:    Kath Echevarria is a 19 year old female with comes in for further evaluation of hand pain and stiffness around early 2022. Then started having issues with wrist and fingers and then went to ER and had X rays of the hand with swelling.     Then saw rheumatologist after trying NSAIDS.    Then started starting happening more frequently. Pain occurred every day and PCP started the plaquenil in . And took medication for about 1 month and maybe stopped because of no followup and no refills.     NO major issues with shoulders, elbows ,     Has constant pain and swelling wrists or hands (difficulty with gribbing)     Has some on and off knee pain; (right) no low back; hip pain (on and off)     Denies any major issues with  her neck    Denies any history of DVT PE TIA CVA seizures migraines or headaches    States no shortness of breath ,chest pain ,fevers ,chills    States no urinary or bowel symptoms; bloody stools    States no headaches jaw pain, vision changes (once in while)     States no history of pericardial, pleural effusions     States no significant dry eyes or dry mouth     States no history of uveitis iritis scleritis    States no history of Raynaud's or digital ulcerations    States no major weight changes; night sweats    Wt Readings from Last 6 Encounters:   05/05/25 236 lb (107 kg)   05/02/25 237 lb (107.5 kg)   02/03/25 247 lb (112 kg)   11/04/24 261 lb (118.4 kg)   08/29/24 260 lb (117.9 kg)       Her weight has been stable    States no history of photosensitive or malar rash.    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  We have discussed increasing sulfasalazine to thousand twice a day as long as labs are okay today    She states no side effects and does feel significant improvement overall    She was last seen February 2025    She is doing well she stopped her sulfasalazine with the last dosing sometime early December 2024 and prednisone last 5 mg January 2024 in the first week.  She states she  is back to working out and doing her regular routine and playing soccer.  We discussed importance of compliance in order to for her to feel better.  She states possibility of some mood disturbance on sulfasalazine we discussed likelihood of this being related to prednisone and remaining off of that    She did stop her medication again and did not get labs as we recommended    Discussed importance of compliance again and she was restarted on sulfasalazine 500 twice a day.      Reminded her to get labs to monitor for drug toxicity today and every 3 months.  We have discussed not giving her refills until she gets labs regularly.  We will only give her 3 months at a time she understands since she has been noncompliant on several occasions without labs we will move forward doing this she had recent diagnosis of HPV.  We had discussed risk versus benefits of sulfasalazine in connection with HPV and increased risk of cancer which is minimal but still possible.  But also irreversible joint damage and debility without treating her rheumatoid arthritis    Would like to proceed with treatment.    Discussed the importance of compliance in order to prevent irreversible joint damage and debility    X-rays of the hands and feet and chest x-ray showing minimal to no arthritis chest x-ray normal from August 2024      History/Other:      Past Medical History:  Past Medical History:    Benign hypermobility syndrome    COVID-19    Positive test 4/11/25      Depression    mild depression 2/2025    Hyperuricemia w/o signs of inflam arthrit and tophaceous dis    10/10/22 Uric acid 6.8    Insomnia    LGSIL on Pap smear of cervix    1/28/25 LSIL, +HPV - PCP Avinash Riggs MD    Morbid obesity with BMI of 40.0-44.9, adult (HCC)    Noncompliance    Obesity (BMI 30-39.9)    Osteoarthritis of multiple joints    Plantar fasciitis    Polyarthritis    Rheumatoid arthritis involving multiple sites with positive rheumatoid factor (HCC)    Tobacco use     Vitamin D deficiency    01/19/22 Vit D 9.4,   11/29/23  Vit D 8.9        Past Surgical History:   Past Surgical History:   Procedure Laterality Date    Other surgical history Right 2013    tooth extraction - cavity - lower right side       Social History:  reports that she has never smoked. She has never used smokeless tobacco. She reports current alcohol use. She reports that she does not currently use drugs after having used the following drugs: Cannabis.    Family History:   Family History   Problem Relation Age of Onset    No Known Problems Mother     No Known Problems Father     No Known Problems Sister     No Known Problems Sister     No Known Problems Brother     Breast Cancer Neg     Ovarian Cancer Neg     Colon Cancer Neg     Birth Defects Neg     Genetic Disease Neg     Pancreatic Cancer Neg     Uterine Cancer Neg     Infertility Neg     Endometriosis Neg     Diabetes Neg     Thyroid disease Neg     Stroke Neg     Heart Attack Neg     Clotting Disorder Neg        Allergies: No Known Allergies    Current Medications:  Current Outpatient Medications   Medication Sig Dispense Refill    Drospirenone-Ethinyl Estradiol (NEREYDA) 3-0.02 MG Oral Tab Take 1 tablet by mouth daily. 84 tablet 3    sulfaSALAzine 500 MG Oral Tab Take 1 tablet (500 mg total) by mouth 2 (two) times daily. 60 tablet 1    pantoprazole 20 MG Oral Tab EC Take 1 tablet (20 mg total) by mouth every morning before breakfast. 30 tablet 0      No current facility-administered medications for this visit.     (Not in a hospital admission)      Review of Systems:     Constitutional: Negative for chills, , fatigue, fever and unexpected weight change.    HENT: Negative for congestion, and mouth sores.    Eyes: Negative for photophobia, pain, redness and visual disturbance.    Respiratory: Negative for apnea, cough, chest tightness, shortness of breath, wheezing and stridor.    Cardiovascular: Negative for chest pain, palpitations and leg  swelling.    Gastrointestinal: Negative for abdominal distention, abdominal pain, blood in stool, constipation, diarrhea and nausea.    Endocrine: Negative.     Genitourinary: Negative for decreased urine volume, difficulty urinating, dyspareunia, dysuria, flank pain, and frequency.    Musculoskeletal: +arthralgias, NO gait problem and +joint swelling.    Skin: Negative for color change, pallor and rash. No raynauds or digital ulcerations no sclerodactly.    Allergic/Immunologic: Negative.    Neurological: Negative for dizziness, tremors, seizures, syncope, speech difficulty, weakness, light-headedness, numbness and headaches.    Hematological: Does not bruise/bleed easily.    Psychiatric/Behavioral: Negative for confusion, decreased concentration, hallucinations, self-injury, sleep disturbance and suicidal ideas or depression.    Objective:   Vitals:    05/05/25 1158   BP: 110/70   Pulse: 90   Resp: 16   Temp: 98.3 °F (36.8 °C)            Constitutional: is oriented to person, place, and time. Appears well-developed and well-nourished. No distress.    HEENT: Normocephalic; EOMI; no jvd; no LAD; no oral or nasal ulcers.     Eyes: Conjunctivae and EOM are normal. Pupils are equal, round, and reactive to light.     Neck: Normal range of motion. No thyromegaly present.    Cardiovascular: RRR, no murmurs.    Lungs: Clear, Bilateral air entry, no wheezes.    Abdominal: Soft.    Musculoskeletal:         Joint Exam 05/05/2025        Right  Left   MCP 2     Swollen Tender   MCP 3     Swollen Tender        Swollen: 2      Tender: 2          Right shoulder: Exhibits normal range of motion on abduction and internal rotation, no tenderness, no bony tenderness, no deformity, no laceration, no pain and no spasm.        Left shoulder: Exhibits normal range of motion on abduction and internal and external rotation.  no tenderness, no bony tenderness, no swelling, no effusion, no deformity, no pain, no spasm and normal strength.         Right elbow:  Exhibits normal range of motion, no swelling, no effusion and no deformity. No tenderness found. No medial epicondyle, no lateral epicondyle and no olecranon process tenderness noted. There are no contractures or tophi or nodules.        Left elbow:  Normal range of motion, no swelling, no effusion and no deformity. No medial epicondyle, no lateral epicondyle and no olecranon process tenderness noted. There are no contractures or tophi or nodules.        Right wrist:  Exhibits normal range of motion, no tenderness, no bony tenderness, no swelling, no effusion and no crepitus. Flexion and extension intact w/o limitation.        Left wrist: Exhibits normal range of motion, no tenderness, no bony tenderness, no swelling, no effusion, no crepitus and no deformity. Flexion and extension intact without limitation.        Right hip: Exhibits normal range of motion, normal strength, no tenderness, no bony tenderness, no swelling and no crepitus.        Right hand: No synovitis of MCP or DIP joints; no Bouchards or Heberden nodules noted;  strength: 100%.        Left hand: No synovitis of MCP,PIP or DIP joints; no Bouchards or Heberden nodules noted;  strength: 100%.        Left hip: Exhibits normal range of motion, normal strength, no tenderness, no bony tenderness, No swelling and no crepitus.        Right knee: Exhibits normal range of motion, no swelling, no effusion, no ecchymosis, no deformity and no erythema. No tenderness found. No medial joint line, no lateral joint line, no MCL and no LCL tenderness noted. No crepitation on flexion of knee and extension normal.        Left knee:  Exhibits normal range of motion, no swelling, no effusion, no ecchymosis and no erythema. No tenderness found. No medial joint line, no lateral joint line and no patellar tendon tenderness noted. No crepitation on flexion of the knee. Extension intact and normal.        Right ankle: No swelling, no deformity. No  tenderness. Dorsiflexion and plantar flexion intact without limitation in range of motion.        Left ankle: Exhibits no swelling. No tenderness. No lateral malleolus and no medial malleolus tenderness found. Achilles tendon normal. Achilles tendon exhibits no pain, no defect and normal Blanton's test results.  Dorsiflexion and plantar flexion intact without limitation in range of motion.        Cervical back: Exhibits normal range of motion, no tenderness, no bony tenderness, no swelling, no pain and no spasm.        Thoracic back: Exhibits normal range of motion, no tenderness, no bony tenderness and no spasm.        Lumbar back:  Exhibits normal range of motion, no tenderness, no bony tenderness, no pain and no spasm.        Right foot: normal. There is normal range of motion, no tenderness, no bony tenderness, no crepitus and no laceration. There is no synovitis or tenderness of the MTP joints to palpation.        Left foot: normal. There is normal range of motion, no tenderness, no bony tenderness and no crepitus. There is no synovitis or tenderness of the MTP joints to palpation.    Lymphadenopathy: No submental, no submandibular, and no occipital adenopathy present, has no cervical adenopathy or axillary lympadenopathy.    Neurological: Alert and oriented. No focal motor or sensory abnormalities. Strength is 5/5 Upper Extremities/Lower Extremities proximally and distally.    Skin: Skin is warm, dry and intact.    Psychiatric: Normal behavior.    Results:    Labs:    omponent  Ref Range & Units 11/10/22 12:18 PM   Glucose 6 Phosphatase Dehydrogenase, Quantitative  9.9 - 16.6 U/g Hb 18.5 High    Comment:  Elevated glucose-6-phosphate dehydrogenase activity may occur when  reticulocytes or white blood cells are present in high  concentrations.  Performed By: [address]     Component 10/10/22 12:32 PM   HLA-B27 NEGATIVE   Interpretive Comment HLA B27 NEGATIVE  Approximately 90% of Am      omponent  Ref Range & Units 3/2/23  1:48 PM   QuantiFERON Incubation Incubation performed.   QuantiFERON-TB Gold Plus  Negative Negative   Comment: No response to M tuberculosis antigens detected.  Infection with M tuberculosis is unlikely, but high risk  individuals should be considered for additional testing       Lab Results   Component Value Date    WBC 11.2 (H) 11/04/2024    RBC 4.92 11/04/2024    HGB 12.7 11/04/2024    HCT 39.5 11/04/2024    MCV 80.3 11/04/2024    MCH 25.8 (L) 11/04/2024    MCHC 32.2 11/04/2024    RDW 15.6 11/04/2024    .0 11/04/2024       No components found for: \"RELY\", \"NMET\", \"MYEL\", \"PROMY\", \"ROSE\", \"ABSNEUTS\", \"ABSBANDS\", \"ABMM\", \"ABMY\", \"ABPM\", \"ABBL\"      Lab Results   Component Value Date     11/04/2024    K 4.2 11/04/2024    CO2 29.0 11/04/2024    BUN 10 11/04/2024    ALB 4.6 11/04/2024    AST 15 11/04/2024    ALT 31 11/04/2024          No components found for: \"ESRWESTERGRN\"       Lab Results   Component Value Date    CRP <0.40 11/04/2024         No results found for: \"COLOR\", \"CLARITY\", \"UROBILINOGEN\", \"YEAST\"  @LABRCNTIP(RF,B12)@      [unfilled]    Imaging:  No results found.    Assessment & Plan:      19-year-old woman comes in for further evaluation for joint swelling stiffness positive RF CCP greater than 150 elevated ESR CRP    Seropositive rheumatoid arthritis  Mild osteoarthritis multiple joint  Noncompliance to treatment  Recent diagnosis of HPV discussed risk versus benefits of sulfasalazine with current diagnosis of HPV    Minimal synovitis on exam  Patient stopped treatment since she was doing well  Discussed importance of compliance in order to maintain her rheumatoid arthritis and prevent irreversible joint damage and debility  Will order labs again will not refill medications in the future without labs on time since patient did not get them done as a recommended previous visit  Restart sulfasalazine but start lower dose of 500 twice a day  Remain off  prednisone  Reminded patient to get labs today and every 8 to 12 weeks to monitor for drug toxicity  Mild hyperuricemia without history of gouty arthritis we will monitor  X-rays of the hands and feet showing mild arthritic changes no erosions from 2024   Chest x-ray normal 2024     Minimal improvement on hydroxychloroquine after 1 month could consider addition of this in the future    Since she is sexually active and only doing condoms we will opt for sulfasalazine as a next step    Recent diagnosis of HPV risk versus benefits of sulfasalazine discussed does have some immunosuppressive concerns but unfortunately she will have irreversible joint damage and debility if she does not go on any DMARD treatment or immunotherapy she understands and would like to proceed    Discussed the diagnosis of rheumatoid arthritis in detail written information given to the patient as well and both steroids and sulfasalazine in the meantime    Education and counseling provided to patient.  Instructed patient to call my office or seek medical attention immediately if symptoms worsen. Risks and side effects of medications and diagnosis discussed in detail and patient was given written information on new prescribed medications.    Return to clinic:  Return in about 6 months (around 11/5/2025).    Christine Strickland MD  8/29/2024

## 2025-05-06 DIAGNOSIS — E55.9 VITAMIN D DEFICIENCY: Primary | ICD-10-CM

## 2025-05-06 RX ORDER — ERGOCALCIFEROL 1.25 MG/1
50000 CAPSULE, LIQUID FILLED ORAL WEEKLY
Qty: 12 CAPSULE | Refills: 0 | Status: SHIPPED | OUTPATIENT
Start: 2025-05-06

## 2025-05-06 NOTE — TELEPHONE ENCOUNTER
0 Result Notes        Component  Ref Range & Units (hover) 1 d ago 6 mo ago 8 mo ago   Vitamin D, 25OH, Total 10.0 Low  7.6 Low  CM 12.4 Low  CM   Comment: Literature Recommendations for 25(OH)D levels are:  Range           Vitamin D Status   <20    ng/mL      Deficiency   20-<30 ng/mL      Insufficiency    ng/mL      Sufficiency   >100   ng/mL      Toxicity          Vitamin D is very low would do high-dose vitamin D 50,000 units once a week for 3 months and then over-the-counter at least 3 to 4000 IU D3    Inflammation markers are higher likely because patient is not taking medication consistently    Uric acid levels are also elevated at 7.7 would follow gout diet otherwise she will develop gouty arthritis as well    Read about gout diet online if this continues to go higher we will need to start her on agents that lower uric acid levels called allopurinol she can do further reading on this

## 2025-08-11 ENCOUNTER — LAB REQUISITION (OUTPATIENT)
Dept: LAB | Facility: HOSPITAL | Age: 20
End: 2025-08-11

## 2025-08-11 DIAGNOSIS — Z00.00 ENCOUNTER FOR GENERAL ADULT MEDICAL EXAMINATION WITHOUT ABNORMAL FINDINGS: ICD-10-CM

## 2025-08-11 DIAGNOSIS — Z13.1 ENCOUNTER FOR SCREENING FOR DIABETES MELLITUS: ICD-10-CM

## 2025-08-11 DIAGNOSIS — E55.9 VITAMIN D DEFICIENCY, UNSPECIFIED: ICD-10-CM

## 2025-08-11 DIAGNOSIS — M05.79 RHEUMATOID ARTHRITIS WITH RHEUMATOID FACTOR OF MULTIPLE SITES WITHOUT ORGAN OR SYSTEMS INVOLVEMENT (HCC): ICD-10-CM

## 2025-08-11 LAB
ALBUMIN SERPL-MCNC: 4.4 G/DL (ref 3.2–4.8)
ALBUMIN/GLOB SERPL: 1.5 (ref 1–2)
ALP LIVER SERPL-CCNC: 68 U/L (ref 52–144)
ALT SERPL-CCNC: 13 U/L (ref 10–49)
ANION GAP SERPL CALC-SCNC: 13 MMOL/L (ref 0–18)
AST SERPL-CCNC: 11 U/L (ref ?–34)
BASOPHILS # BLD AUTO: 0.05 X10(3) UL (ref 0–0.2)
BASOPHILS NFR BLD AUTO: 0.5 %
BILIRUB SERPL-MCNC: 0.8 MG/DL (ref 0.3–1.2)
BUN BLD-MCNC: 9 MG/DL (ref 9–23)
CALCIUM BLD-MCNC: 9.6 MG/DL (ref 8.7–10.6)
CHLORIDE SERPL-SCNC: 102 MMOL/L (ref 98–112)
CHOLEST SERPL-MCNC: 199 MG/DL (ref ?–200)
CO2 SERPL-SCNC: 22 MMOL/L (ref 21–32)
CREAT BLD-MCNC: 0.58 MG/DL (ref 0.55–1.02)
EGFRCR SERPLBLD CKD-EPI 2021: 132 ML/MIN/1.73M2 (ref 60–?)
EOSINOPHIL # BLD AUTO: 0.12 X10(3) UL (ref 0–0.7)
EOSINOPHIL NFR BLD AUTO: 1.3 %
ERYTHROCYTE [DISTWIDTH] IN BLOOD BY AUTOMATED COUNT: 13.5 %
EST. AVERAGE GLUCOSE BLD GHB EST-MCNC: 97 MG/DL (ref 68–126)
GLOBULIN PLAS-MCNC: 3 G/DL (ref 2–3.5)
GLUCOSE BLD-MCNC: 86 MG/DL (ref 70–99)
HBA1C MFR BLD: 5 % (ref ?–5.7)
HCT VFR BLD AUTO: 39.8 % (ref 35–48)
HDLC SERPL-MCNC: 58 MG/DL (ref 40–59)
HGB BLD-MCNC: 12.5 G/DL (ref 12–16)
IMM GRANULOCYTES # BLD AUTO: 0.07 X10(3) UL (ref 0–1)
IMM GRANULOCYTES NFR BLD: 0.8 %
LDLC SERPL CALC-MCNC: 91 MG/DL (ref ?–100)
LYMPHOCYTES # BLD AUTO: 2.49 X10(3) UL (ref 1–4)
LYMPHOCYTES NFR BLD AUTO: 27.3 %
MCH RBC QN AUTO: 25.8 PG (ref 26–34)
MCHC RBC AUTO-ENTMCNC: 31.4 G/DL (ref 31–37)
MCV RBC AUTO: 82.1 FL (ref 80–100)
MONOCYTES # BLD AUTO: 0.75 X10(3) UL (ref 0.1–1)
MONOCYTES NFR BLD AUTO: 8.2 %
NEUTROPHILS # BLD AUTO: 5.64 X10 (3) UL (ref 1.5–7.7)
NEUTROPHILS # BLD AUTO: 5.64 X10(3) UL (ref 1.5–7.7)
NEUTROPHILS NFR BLD AUTO: 61.9 %
NONHDLC SERPL-MCNC: 141 MG/DL (ref ?–130)
OSMOLALITY SERPL CALC.SUM OF ELEC: 282 MOSM/KG (ref 275–295)
PLATELET # BLD AUTO: 419 10(3)UL (ref 150–450)
POTASSIUM SERPL-SCNC: 4.1 MMOL/L (ref 3.5–5.1)
PROT SERPL-MCNC: 7.4 G/DL (ref 5.7–8.2)
RBC # BLD AUTO: 4.85 X10(6)UL (ref 3.8–5.3)
RHEUMATOID FACT SERPL-ACNC: 247.3 IU/ML (ref ?–14)
SODIUM SERPL-SCNC: 137 MMOL/L (ref 136–145)
TRIGL SERPL-MCNC: 306 MG/DL (ref 30–149)
TSI SER-ACNC: 0.86 UIU/ML (ref 0.55–4.78)
URATE SERPL-MCNC: 7.3 MG/DL (ref 3.1–7.8)
VIT D+METAB SERPL-MCNC: 10.9 NG/ML (ref 30–100)
VLDLC SERPL CALC-MCNC: 50 MG/DL (ref 0–30)
WBC # BLD AUTO: 9.1 X10(3) UL (ref 4–11)

## 2025-08-11 PROCEDURE — 83036 HEMOGLOBIN GLYCOSYLATED A1C: CPT | Performed by: INTERNAL MEDICINE

## 2025-08-11 PROCEDURE — 86431 RHEUMATOID FACTOR QUANT: CPT | Performed by: INTERNAL MEDICINE

## 2025-08-11 PROCEDURE — 84550 ASSAY OF BLOOD/URIC ACID: CPT | Performed by: INTERNAL MEDICINE

## 2025-08-11 PROCEDURE — 80053 COMPREHEN METABOLIC PANEL: CPT | Performed by: INTERNAL MEDICINE

## 2025-08-11 PROCEDURE — 85025 COMPLETE CBC W/AUTO DIFF WBC: CPT | Performed by: INTERNAL MEDICINE

## 2025-08-11 PROCEDURE — 84443 ASSAY THYROID STIM HORMONE: CPT | Performed by: INTERNAL MEDICINE

## 2025-08-11 PROCEDURE — 82306 VITAMIN D 25 HYDROXY: CPT | Performed by: INTERNAL MEDICINE

## 2025-08-11 PROCEDURE — 80061 LIPID PANEL: CPT | Performed by: INTERNAL MEDICINE

## (undated) NOTE — LETTER
Kath Echevarria, :2005    CONSENT FOR PROCEDURE/SEDATION    1. I authorize the performance upon Kath Echevarria  the following: Colposcopy with biopsy and Endocervical curettage    2. I authorize Dr. Shannon Perez MD (and whomever is designated as the doctor’s assistant), to perform the above-mentioned procedures.    3. If any unforeseen conditions arise during this procedure calling for additional  procedures, operations, or medications (including anesthesia and blood transfusion), I further request and authorize the doctor to do whatever he/she deems advisable in my interest.    4. I consent to the taking and reproduction of any photographs in the course of this procedure for professional purposes.    5. I consent to the administration of such sedation as may be considered necessary or advisable by the physician responsible for this service, with the exception of ______________________________________________________    6. I have been informed by my doctor of the nature and purpose of this procedure sedation, possible alternative methods of treatment, risk involved and possible complications.    7. If I have a Do Not Resuscitate (DNR) order in place, the physician and I (or the individual authorized to consent on my behalf) will discuss and agree as to whether the Do Not Resuscitate (DNR) order will remain in effect or will be discontinued during the performance of the procedure and the applicable recovery period. If the Do Not Resuscitate (DNR) order is discontinued and is to be reinstated following the procedure/recovery period, the physician will determine when the applicable recovery period ends for purposes of reinstating the Do Not Resuscitate (DNR) order.    Signature of Patient:_______________________________________________    Signature of person authorized to consent for patient:  _______________________________________________________________    Relationship to patient:  ____________________________________________    Witness: _________________________________________ Date:___________     Physician Signature: _______________________________ Date:___________